# Patient Record
Sex: FEMALE | Race: WHITE | NOT HISPANIC OR LATINO | URBAN - METROPOLITAN AREA
[De-identification: names, ages, dates, MRNs, and addresses within clinical notes are randomized per-mention and may not be internally consistent; named-entity substitution may affect disease eponyms.]

---

## 2023-01-12 ENCOUNTER — OFFICE VISIT (OUTPATIENT)
Dept: URBAN - METROPOLITAN AREA CLINIC 80 | Facility: CLINIC | Age: 63
End: 2023-01-12

## 2023-01-31 ENCOUNTER — OFFICE VISIT (OUTPATIENT)
Dept: URBAN - METROPOLITAN AREA TELEHEALTH 2 | Facility: TELEHEALTH | Age: 63
End: 2023-01-31
Payer: COMMERCIAL

## 2023-01-31 ENCOUNTER — DASHBOARD ENCOUNTERS (OUTPATIENT)
Age: 63
End: 2023-01-31

## 2023-01-31 VITALS — HEIGHT: 67 IN | WEIGHT: 140 LBS | BODY MASS INDEX: 21.97 KG/M2

## 2023-01-31 DIAGNOSIS — Z12.11 COLON CANCER SCREENING: ICD-10-CM

## 2023-01-31 DIAGNOSIS — Z80.0 FAMILY HISTORY OF COLON CANCER: ICD-10-CM

## 2023-01-31 PROCEDURE — 99202 OFFICE O/P NEW SF 15 MIN: CPT | Performed by: INTERNAL MEDICINE

## 2023-01-31 RX ORDER — ROSUVASTATIN CALCIUM 10 MG/1
TABLET, FILM COATED ORAL
Qty: 90 TABLET | Status: ACTIVE | COMMUNITY

## 2023-01-31 RX ORDER — LEVOTHYROXINE SODIUM 0.05 MG/1
TABLET ORAL
Qty: 90 TABLET | Status: ACTIVE | COMMUNITY

## 2023-01-31 NOTE — HPI-TODAY'S VISIT:
GF CRC- on an every 5 year Colonoscopy schedule  Was hiking in September, noted some blood when wiping.  Lost 55 pounds with intermittent fasting.  Notes she feels so much better with this program.
24.3

## 2023-03-23 ENCOUNTER — WEB ENCOUNTER (OUTPATIENT)
Dept: URBAN - METROPOLITAN AREA SURGERY CENTER 19 | Facility: SURGERY CENTER | Age: 63
End: 2023-03-23

## 2023-03-28 ENCOUNTER — OFFICE VISIT (OUTPATIENT)
Dept: URBAN - METROPOLITAN AREA SURGERY CENTER 19 | Facility: SURGERY CENTER | Age: 63
End: 2023-03-28
Payer: COMMERCIAL

## 2023-03-28 DIAGNOSIS — Z80.0 FAMILY HISTORY OF COLON CANCER: ICD-10-CM

## 2023-03-28 DIAGNOSIS — Z12.11 COLON CANCER SCREENING: ICD-10-CM

## 2023-03-28 PROCEDURE — G8907 PT DOC NO EVENTS ON DISCHARG: HCPCS | Performed by: INTERNAL MEDICINE

## 2023-03-28 PROCEDURE — 45378 DIAGNOSTIC COLONOSCOPY: CPT | Performed by: INTERNAL MEDICINE

## 2023-03-28 RX ORDER — ROSUVASTATIN CALCIUM 10 MG/1
TABLET, FILM COATED ORAL
Qty: 90 TABLET | Status: ACTIVE | COMMUNITY

## 2023-03-28 RX ORDER — LEVOTHYROXINE SODIUM 0.05 MG/1
TABLET ORAL
Qty: 90 TABLET | Status: ACTIVE | COMMUNITY

## 2024-01-17 ENCOUNTER — OFFICE VISIT (OUTPATIENT)
Dept: ORTHOPEDIC SURGERY | Facility: CLINIC | Age: 64
End: 2024-01-17
Payer: COMMERCIAL

## 2024-01-17 VITALS — HEIGHT: 68 IN | TEMPERATURE: 98.3 F | WEIGHT: 138.8 LBS | BODY MASS INDEX: 21.04 KG/M2

## 2024-01-17 DIAGNOSIS — M25.562 PAIN IN BOTH KNEES, UNSPECIFIED CHRONICITY: Primary | ICD-10-CM

## 2024-01-17 DIAGNOSIS — M25.561 PAIN IN BOTH KNEES, UNSPECIFIED CHRONICITY: Primary | ICD-10-CM

## 2024-01-17 DIAGNOSIS — M17.10 ARTHRITIS OF KNEE: ICD-10-CM

## 2024-01-17 PROBLEM — M17.11 ARTHRITIS OF RIGHT KNEE: Status: ACTIVE | Noted: 2024-01-17

## 2024-01-17 RX ORDER — DICYCLOMINE HYDROCHLORIDE 10 MG/1
CAPSULE ORAL
COMMUNITY

## 2024-01-17 RX ORDER — PREGABALIN 75 MG/1
150 CAPSULE ORAL ONCE
OUTPATIENT
Start: 2024-03-14 | End: 2024-01-17

## 2024-01-17 RX ORDER — ACETAMINOPHEN 325 MG/1
1000 TABLET ORAL ONCE
OUTPATIENT
Start: 2024-03-14 | End: 2024-01-17

## 2024-01-17 RX ORDER — ROSUVASTATIN CALCIUM 10 MG/1
TABLET, COATED ORAL
COMMUNITY

## 2024-01-17 RX ORDER — CHLORHEXIDINE GLUCONATE 500 MG/1
CLOTH TOPICAL TAKE AS DIRECTED
OUTPATIENT
Start: 2024-01-17

## 2024-01-17 RX ORDER — CEFAZOLIN SODIUM 2 G/100ML
2 INJECTION, SOLUTION INTRAVENOUS ONCE
OUTPATIENT
Start: 2024-03-14 | End: 2024-01-17

## 2024-01-17 RX ORDER — VANCOMYCIN HYDROCHLORIDE 1 G/200ML
15 INJECTION, SOLUTION INTRAVENOUS ONCE
OUTPATIENT
Start: 2024-03-14 | End: 2024-01-17

## 2024-01-17 RX ORDER — LEVOTHYROXINE SODIUM 0.05 MG/1
TABLET ORAL
COMMUNITY

## 2024-01-17 RX ORDER — MELOXICAM 15 MG/1
15 TABLET ORAL ONCE
OUTPATIENT
Start: 2024-03-14 | End: 2024-01-17

## 2024-01-19 ENCOUNTER — PATIENT ROUNDING (BHMG ONLY) (OUTPATIENT)
Dept: ORTHOPEDIC SURGERY | Facility: CLINIC | Age: 64
End: 2024-01-19
Payer: COMMERCIAL

## 2024-01-19 PROBLEM — M17.10 ARTHRITIS OF KNEE: Status: ACTIVE | Noted: 2024-01-17

## 2024-01-19 NOTE — PROGRESS NOTES
A [place] Message has been sent to the patient for PATIENT ROUNDING with [facility]

## 2024-01-22 ENCOUNTER — TELEPHONE (OUTPATIENT)
Dept: ORTHOPEDIC SURGERY | Facility: CLINIC | Age: 64
End: 2024-01-22
Payer: COMMERCIAL

## 2024-01-22 NOTE — TELEPHONE ENCOUNTER
I spoke to patient.  I answered all questions to her satisfaction.  She verbalized understanding and appreciated the call

## 2024-01-22 NOTE — TELEPHONE ENCOUNTER
----- Message from Linette Leigh sent at 1/22/2024  8:16 AM EST -----  Regarding: FW: Welcome  Contact: [phone]  Please review pt message regarding pre-op instructions.   Pt scheduled for unicompartment knee replacement: 3/14/24      ----- Message -----  From: Yessica Mejia  Sent: 1/22/2024   8:02 AM EST  To: [facility] [place]  Subject: FW: Welcome                                      Please see message below  ----- Message -----  From: Berenice Granger  Sent: 1/19/2024   2:16 PM EST  To: Yessica Mejia  Subject: Welcome                                          I do have a question about how to follow guidelines/ Anesthesia guidelines and protocol for chlorohexidine skin prep… it is to be completed by Jan 22nd… it doesn’t tell me how to go about it? Thanks

## 2024-02-29 ENCOUNTER — PRE-ADMISSION TESTING (OUTPATIENT)
Dept: PREADMISSION TESTING | Facility: HOSPITAL | Age: 64
End: 2024-02-29
Payer: COMMERCIAL

## 2024-02-29 VITALS
HEIGHT: 66 IN | OXYGEN SATURATION: 98 % | SYSTOLIC BLOOD PRESSURE: 137 MMHG | BODY MASS INDEX: 23.19 KG/M2 | TEMPERATURE: 97.8 F | HEART RATE: 81 BPM | DIASTOLIC BLOOD PRESSURE: 78 MMHG | WEIGHT: 144.3 LBS | RESPIRATION RATE: 16 BRPM

## 2024-02-29 LAB
ANION GAP SERPL CALCULATED.3IONS-SCNC: 11 MMOL/L (ref 5–15)
BUN SERPL-MCNC: 16 MG/DL (ref 8–23)
BUN/CREAT SERPL: 20.8 (ref 7–25)
CALCIUM SPEC-SCNC: 9.5 MG/DL (ref 8.6–10.5)
CHLORIDE SERPL-SCNC: 104 MMOL/L (ref 98–107)
CO2 SERPL-SCNC: 25 MMOL/L (ref 22–29)
CREAT SERPL-MCNC: 0.77 MG/DL (ref 0.57–1)
DEPRECATED RDW RBC AUTO: 40.2 FL (ref 37–54)
EGFRCR SERPLBLD CKD-EPI 2021: 87.9 ML/MIN/1.73
ERYTHROCYTE [DISTWIDTH] IN BLOOD BY AUTOMATED COUNT: 12.5 % (ref 12.3–15.4)
GLUCOSE SERPL-MCNC: 98 MG/DL (ref 65–99)
HCT VFR BLD AUTO: 37.6 % (ref 34–46.6)
HGB BLD-MCNC: 12.2 G/DL (ref 12–15.9)
MCH RBC QN AUTO: 28.9 PG (ref 26.6–33)
MCHC RBC AUTO-ENTMCNC: 32.4 G/DL (ref 31.5–35.7)
MCV RBC AUTO: 89.1 FL (ref 79–97)
PLATELET # BLD AUTO: 247 10*3/MM3 (ref 140–450)
PMV BLD AUTO: 9.3 FL (ref 6–12)
POTASSIUM SERPL-SCNC: 4 MMOL/L (ref 3.5–5.2)
QT INTERVAL: 401 MS
QTC INTERVAL: 408 MS
RBC # BLD AUTO: 4.22 10*6/MM3 (ref 3.77–5.28)
SODIUM SERPL-SCNC: 140 MMOL/L (ref 136–145)
WBC NRBC COR # BLD AUTO: 4.28 10*3/MM3 (ref 3.4–10.8)

## 2024-02-29 PROCEDURE — 93010 ELECTROCARDIOGRAM REPORT: CPT | Performed by: INTERNAL MEDICINE

## 2024-02-29 PROCEDURE — 93005 ELECTROCARDIOGRAM TRACING: CPT

## 2024-02-29 PROCEDURE — 80048 BASIC METABOLIC PNL TOTAL CA: CPT

## 2024-02-29 PROCEDURE — 85027 COMPLETE CBC AUTOMATED: CPT

## 2024-02-29 PROCEDURE — 36415 COLL VENOUS BLD VENIPUNCTURE: CPT

## 2024-02-29 RX ORDER — ACETAMINOPHEN 500 MG
500-1000 TABLET ORAL EVERY 6 HOURS PRN
COMMUNITY

## 2024-02-29 RX ORDER — GABAPENTIN 300 MG/1
300 CAPSULE ORAL EVERY EVENING
COMMUNITY

## 2024-02-29 RX ORDER — FEXOFENADINE HCL 180 MG/1
180 TABLET ORAL DAILY PRN
COMMUNITY

## 2024-02-29 NOTE — DISCHARGE INSTRUCTIONS
Take the following medications the morning of surgery: LEVOTHYROXINE, OMEPRAZOLE      If you are on prescription narcotic pain medication to control your pain you may also take that medication the morning of surgery.    General Instructions:  Do not eat solid food after midnight the night before surgery.  You may drink clear liquids day of surgery but must stop at least one hour before your hospital arrival time.  It is beneficial for you to have a clear drink that contains carbohydrates the day of surgery.  We suggest a 12 to 20 ounce bottle of Gatorade or Powerade for non-diabetic patients or a 12 to 20 ounce bottle of G2 or Powerade Zero for diabetic patients.     Clear liquids are liquids you can see through.  Nothing red in color.     Plain water                               Sports drinks  Sodas                                   Gelatin (Jell-O)  Fruit juices without pulp such as white grape juice and apple juice  Popsicles that contain no fruit or yogurt  Tea or coffee (no cream or milk added)  Gatorade / Powerade  G2 / Powerade Zero    Patients who avoid smoking, chewing tobacco and alcohol for 4 weeks prior to surgery have a reduced risk of post-operative complications.  Quit smoking as many days before surgery as you can.  Do not smoke, use chewing tobacco or drink alcohol the day of surgery.   If applicable bring your C-PAP/ BI-PAP machine in with you to preop day of surgery.  Bring any papers given to you in the doctor’s office.  Wear clean comfortable clothes.  Do not wear contact lenses, false eyelashes or make-up.  Bring a case for your glasses.   Remove all piercings.  Leave jewelry and any other valuables at home.  The Pre-Admission Testing nurse will instruct you to bring medications if unable to obtain an accurate list in Pre-Admission Testing.            Preventing a Surgical Site Infection:  For 2 to 3 days before surgery, avoid shaving with a razor because the razor can irritate skin and make  it easier to develop an infection.    Any areas of open skin can increase the risk of a post-operative wound infection by allowing bacteria to enter and travel throughout the body.  Notify your surgeon if you have any skin wounds / rashes even if it is not near the expected surgical site.  The area will need assessed to determine if surgery should be delayed until it is healed.  The night prior to surgery shower using a fresh bar of anti-bacterial soap (such as Dial) and clean washcloth.  Sleep in a clean bed with clean clothing.  Do not allow pets to sleep with you.  Shower on the morning of surgery using a fresh bar of anti-bacterial soap (such as Dial) and clean washcloth.  Dry with a clean towel and dress in clean clothing.  Ask your surgeon if you will be receiving antibiotics prior to surgery.  Make sure you, your family, and all healthcare providers clean their hands with soap and water or an alcohol based hand  before caring for you or your wound.    Day of surgery:  Your arrival time is approximately two hours before your scheduled surgery time.  Upon arrival, a Pre-op nurse and Anesthesiologist will review your health history, obtain vital signs, and answer questions you may have.  The only belongings needed at this time will be a list of your home medications and if applicable your C-PAP/BI-PAP machine.  A Pre-op nurse will start an IV and you may receive medication in preparation for surgery, including something to help you relax.     Please be aware that surgery does come with discomfort.  We want to make every effort to control your discomfort so please discuss any uncontrolled symptoms with your nurse.   Your doctor will most likely have prescribed pain medications.      If you are going home after surgery you will receive individualized written care instructions before being discharged.  A responsible adult must drive you to and from the hospital on the day of your surgery and ideally stay  with you through the night.   .  Discharge prescriptions can be filled by the hospital pharmacy during regular pharmacy hours.  If you are having surgery late in the day/evening your prescription may be e-prescribed to your pharmacy.  Please verify your pharmacy hours or chose a 24 hour pharmacy to avoid not having access to your prescription because your pharmacy has closed for the day.    If you are staying overnight following surgery, you will be transported to your hospital room following the recovery period.  [facility] has all private rooms.    If you have any questions please call Pre-Admission Testing at [phone].  Deductibles and co-payments are collected on the day of service. Please be prepared to pay the required co-pay, deductible or deposit on the day of service as defined by your plan.      CHLORHEXIDINE CLOTH INSTRUCTIONS  The morning of surgery follow these instructions using the Chlorhexidine cloths you've been given.  These steps reduce bacteria on the body.  Do not use the cloths near your eyes, ears mouth, genitalia or on open wounds.  Throw the cloths away after use but do not try to flush them down a toilet.      Open and remove one cloth at a time from the package.    Leave the cloth unfolded and begin the bathing.  Massage the skin with the cloths using gentle pressure to remove bacteria.  Do not scrub harshly.   Follow the steps below with one 2% CHG cloth per area (6 total cloths).  One cloth for neck, shoulders and chest.  One cloth for both arms, hands, fingers and underarms (do underarms last).  One cloth for the abdomen followed by groin.  One cloth for right leg and foot including between the toes.  One cloth for left leg and foot including between the toes.  The last cloth is to be used for the back of the neck, back and buttocks.    Allow the CHG to air dry 3 minutes on the skin which will give it time to work and decrease the chance of irritation.  The skin may  feel sticky until it is dry.  Do not rinse with water or any other liquid or you will lose the beneficial effects of the CHG.  If mild skin irritation occurs, do rinse the skin to remove the CHG.  Report this to the nurse at time of admission.  Do not apply lotions, creams, ointments, deodorants or perfumes after using the clothes. Dress in clean clothes before coming to the hospital.    Call your surgeon immediately if you experience any of the following symptoms:  Sore Throat  Shortness of Breath or difficulty breathing  Cough  Chills  Body soreness or muscle pain  Headache  Fever  New loss of taste or smell  Do not arrive for your surgery ill.  Your procedure will need to be rescheduled to another time.  You will need to call your physician before the day of surgery to avoid any unnecessary exposure to hospital staff as well as other patients.

## 2024-03-05 ENCOUNTER — TELEPHONE (OUTPATIENT)
Dept: ORTHOPEDIC SURGERY | Facility: HOSPITAL | Age: 64
End: 2024-03-05
Payer: COMMERCIAL

## 2024-03-05 NOTE — TELEPHONE ENCOUNTER
Risk Factor yes no   Age >75  X   BMI <20 >40  X   Patient History     Chronic Pain (2 or more meds/Pain Management)  X   ETOH (more than 3 drinks Daily)  X   Uncontrolled Depression/Bipolar/Schizoaffective Disorder  X   Arrhythmias--  X   Stent placement/MI  X   DVT/PE  X   Pacemaker  X   HTN (uncontrolled or requiring more than 2 medications)  X   CHF/Retained fluids/Edema  X   Stroke with Residual   X   COPD/Asthma  X   ABDIFATAH--Non-compliant with CPAP  X   Diabetes (on insulin or more than 2 meds)         A1C:  X   BPH/Urinary retention (on medication)  X   CKD  X   Home environment and support     Current ambulation status (use of cane, walker, W/C, Multiple falls/weakness)  X   Stairs to enter and throughout home X    Lives Alone  X   Doesn't have support at home  X   Outpatient Screening Assessment    Home needs: (Walker/BSC):  Has walker  ? Steps 6 steps with rail  Caregiver 24-48hrs post-discharge:      Discharge Plan:   [facility]--Doing [facility]     Prescriptions: Meds to bed    Home medications:   [] Blood thinner/anti-coag therapy--   [] BPH or diuretic--  [] BP meds--   ? Pain/Anti-inflammatories--Gabapentin  Pre-op Education:  Educate patient on spinal anesthesia/pain control:  ? patient verbalize understanding    Educate patient on hospital course/timeline:  ?  patient verbalize understanding    Joint Care Class:  ?  yes [] no  Notes:    FEMALE-ADULT-[place]

## 2024-03-08 ENCOUNTER — OFFICE VISIT (OUTPATIENT)
Dept: ORTHOPEDIC SURGERY | Facility: CLINIC | Age: 64
End: 2024-03-08
Payer: COMMERCIAL

## 2024-03-08 VITALS — WEIGHT: 147.6 LBS | BODY MASS INDEX: 23.72 KG/M2 | TEMPERATURE: 97.3 F | HEIGHT: 66 IN

## 2024-03-08 DIAGNOSIS — Z01.818 PREOP EXAMINATION: Primary | ICD-10-CM

## 2024-03-08 RX ORDER — TIZANIDINE HYDROCHLORIDE 4 MG/1
4 CAPSULE, GELATIN COATED ORAL DAILY
COMMUNITY

## 2024-03-08 NOTE — PROGRESS NOTES
History & Physical       Patient: Berenice Granger    YOB: 1960    Medical Record Number: [id number]    Chief Complaints: Right knee osteoarthritis    History of Present Illness: 63 y.o. female presents today in anticipation of upcoming unicompartment knee replacement surgery.  Patient has a long history of worsening symptoms.  Describes the pain as severe, constant, and typically dull and achy. She has tried and failed prolonged conservative treatment. She is struggling with routine daily activities.  This has become a significant issue for overall quality of life. She cannot walk any prolonged distances without having to rest.     Allergies:   Allergies   Allergen Reactions    Latex Itching    Penicillins Hives       Medications:   Home Medications:    Current Outpatient Medications:     acetaminophen (TYLENOL) 500 MG tablet, Take 1-2 tablets by mouth Every 6 (Six) Hours As Needed for Mild Pain., Disp: , Rfl:     dicyclomine (BENTYL) 10 MG capsule, Take 1 capsule by mouth 2 (Two) Times a Day As Needed for Abdominal Cramping., Disp: , Rfl:     fexofenadine (ALLEGRA) 180 MG tablet, Take 1 tablet by mouth Daily As Needed (SINUS ALLERGY ISSUES)., Disp: , Rfl:     gabapentin (NEURONTIN) 300 MG capsule, Take 1 capsule by mouth Every Evening., Disp: , Rfl:     levothyroxine (SYNTHROID, LEVOTHROID) 50 MCG tablet, Take 1 tablet by mouth Daily., Disp: , Rfl:     Omeprazole 20 MG Tablet Delayed Release Dispersible, Take 1 tablet by mouth Daily., Disp: , Rfl:     rosuvastatin (CRESTOR) 10 MG tablet, Take 1 tablet by mouth Every Night., Disp: , Rfl:     TiZANidine (ZANAFLEX) 4 MG capsule, Take 1 capsule by mouth Daily., Disp: , Rfl:     Past Medical History:   Diagnosis Date    Arthritis of back 2015?    CTS (carpal tunnel syndrome) 2023    Corisone Injection in both wrists    Disease of thyroid gland     Dry senile macular degeneration     GERD (gastroesophageal reflux disease)     Hyperlipidemia      "Hypothyroidism     Knee swelling     TAE KNEES. HISTORY OF TORN MENISCUS IN TAE KNEES    Lower back pain     Osteoarthritis (arthritis due to wear and tear of joints)     Seasonal allergies     Spastic colon     Tear of meniscus of knee 2020 2021    Both knees          Past Surgical History:   Procedure Laterality Date    COLONOSCOPY      KNEE ARTHROSCOPY      TAE KNEES    LAPAROSCOPIC CHOLECYSTECTOMY      UTERINE FIBROID EMBOLIZATION      WISDOM TOOTH EXTRACTION            Social History     Occupational History    Not on file   Tobacco Use    Smoking status: Never    Smokeless tobacco: Not on file   Vaping Use    Vaping status: Never Used   Substance and Sexual Activity    Alcohol use: Yes     Alcohol/week: 3.0 standard drinks of alcohol     Types: 3 Glasses of wine per week     Comment: SOME WINE WITH DINNER 3-4 TIMES WEEK    Drug use: Never    Sexual activity: Yes     Partners: Male     Birth control/protection: Post-menopausal      Social History     Social History Narrative    Not on file          Family History   Problem Relation Age of Onset    Osteoporosis Father     Malig Hyperthermia Neg Hx        Review of Systems:  A 14-point review of systems is reviewed with the patient.  Pertinent positives are listed above.  All others are negative.    Physical Exam: 63 y.o. female    Vitals:    03/08/24 1342   Temp: 97.3 °F (36.3 °C)   Weight: 67 kg (147 lb 9.6 oz)   Height: 167.6 cm (66\")       General:  Patient is awake and alert.  Appears in no acute distress or discomfort.    Psych:  Affect and demeanor are appropriate.    Eyes:  Conjunctivae and sclerae; appear grossly normal.  Eyes track well and EOM seems to be intact.    Ears:  No gross abnormalities.  Hearing adequate for the exam.    Cardiovascular:  Regular rate and rhythm.    Lungs:  Good chest expansion.  Breathing unlabored.    Lymph:  No palpable adenopathy about neck or axillae.    Right lower extremity:  Skin benign and intact without evidence for " "swelling, masses or atrophy.  No palpable masses.  Focal tenderness noted over medial joint line.  ROM is: 0-120°.   Knee is stable on exam.  Good strength throughout the lower leg and foot.  Intact sensation throughout.  Palpable pedal pulses with brisk cap refill.    Diagnostic Tests:  Lab Results   Component Value Date    GLUCOSE 98 02/29/2024    CALCIUM 9.5 02/29/2024     02/29/2024    K 4.0 02/29/2024    CO2 25.0 02/29/2024     02/29/2024    BUN 16 02/29/2024    CREATININE 0.77 02/29/2024    BCR 20.8 02/29/2024    ANIONGAP 11.0 02/29/2024     Lab Results   Component Value Date    WBC 4.28 02/29/2024    HGB 12.2 02/29/2024    HCT 37.6 02/29/2024    MCV 89.1 02/29/2024     02/29/2024     No results found for: \"INR\", \"PROTIME\"    Imaging:  Previous x-rays of the knee are reviewed.  The studies show mild medial and patellofemoral compartment osteoarthritis with joint space narrowing, osteophyte formation, and subchondral sclerosis. The leg length alignment x-ray shows fairly neutral alignment.     Assessment:  Right knee osteoarthritis    Plan: We will plan on proceeding with a right unicompartment knee arthroplasty at the patient's request.  I reviewed details of procedure with patient today and discussed all the risks, benefits, alternatives, and limitations of the procedure in laymen's terms with the risks including but not limited to:  neurovascular damage resulting in permanent dysfunction or footdrop and potential need for further surgery, bleeding, infection, hematoma, chronic pain, worsening of pain, persistent symptoms potentially necessitating revision, prosthesis-related problems including loosening or allergy, swelling, loss of motion and arthrofibrosis, weakness, stiffness, instability, DVT, pulmonary embolus, death, stroke, complex regional pain syndrome, and need for additional procedures.  Patient verbalized understanding, and was given the opportunity to ask and have all " questions answered today.  Patient understands that intraoperative findings may necessitate a total knee arthroplasty and agrees to proceed.  No guarantees were given regarding results of surgery.      Zuly Aguilera, AGNIESZKA  03/08/24

## 2024-03-08 NOTE — H&P (VIEW-ONLY)
History & Physical       Patient: Berenice Granger    YOB: 1960    Medical Record Number: [id number]    Chief Complaints: Right knee osteoarthritis    History of Present Illness: 63 y.o. female presents today in anticipation of upcoming unicompartment knee replacement surgery.  Patient has a long history of worsening symptoms.  Describes the pain as severe, constant, and typically dull and achy. She has tried and failed prolonged conservative treatment. She is struggling with routine daily activities.  This has become a significant issue for overall quality of life. She cannot walk any prolonged distances without having to rest.     Allergies:   Allergies   Allergen Reactions    Latex Itching    Penicillins Hives       Medications:   Home Medications:    Current Outpatient Medications:     acetaminophen (TYLENOL) 500 MG tablet, Take 1-2 tablets by mouth Every 6 (Six) Hours As Needed for Mild Pain., Disp: , Rfl:     dicyclomine (BENTYL) 10 MG capsule, Take 1 capsule by mouth 2 (Two) Times a Day As Needed for Abdominal Cramping., Disp: , Rfl:     fexofenadine (ALLEGRA) 180 MG tablet, Take 1 tablet by mouth Daily As Needed (SINUS ALLERGY ISSUES)., Disp: , Rfl:     gabapentin (NEURONTIN) 300 MG capsule, Take 1 capsule by mouth Every Evening., Disp: , Rfl:     levothyroxine (SYNTHROID, LEVOTHROID) 50 MCG tablet, Take 1 tablet by mouth Daily., Disp: , Rfl:     Omeprazole 20 MG Tablet Delayed Release Dispersible, Take 1 tablet by mouth Daily., Disp: , Rfl:     rosuvastatin (CRESTOR) 10 MG tablet, Take 1 tablet by mouth Every Night., Disp: , Rfl:     TiZANidine (ZANAFLEX) 4 MG capsule, Take 1 capsule by mouth Daily., Disp: , Rfl:     Past Medical History:   Diagnosis Date    Arthritis of back 2015?    CTS (carpal tunnel syndrome) 2023    Corisone Injection in both wrists    Disease of thyroid gland     Dry senile macular degeneration     GERD (gastroesophageal reflux disease)     Hyperlipidemia      "Hypothyroidism     Knee swelling     TAE KNEES. HISTORY OF TORN MENISCUS IN TAE KNEES    Lower back pain     Osteoarthritis (arthritis due to wear and tear of joints)     Seasonal allergies     Spastic colon     Tear of meniscus of knee 2020 2021    Both knees          Past Surgical History:   Procedure Laterality Date    COLONOSCOPY      KNEE ARTHROSCOPY      TAE KNEES    LAPAROSCOPIC CHOLECYSTECTOMY      UTERINE FIBROID EMBOLIZATION      WISDOM TOOTH EXTRACTION            Social History     Occupational History    Not on file   Tobacco Use    Smoking status: Never    Smokeless tobacco: Not on file   Vaping Use    Vaping status: Never Used   Substance and Sexual Activity    Alcohol use: Yes     Alcohol/week: 3.0 standard drinks of alcohol     Types: 3 Glasses of wine per week     Comment: SOME WINE WITH DINNER 3-4 TIMES WEEK    Drug use: Never    Sexual activity: Yes     Partners: Male     Birth control/protection: Post-menopausal      Social History     Social History Narrative    Not on file          Family History   Problem Relation Age of Onset    Osteoporosis Father     Malig Hyperthermia Neg Hx        Review of Systems:  A 14-point review of systems is reviewed with the patient.  Pertinent positives are listed above.  All others are negative.    Physical Exam: 63 y.o. female    Vitals:    03/08/24 1342   Temp: 97.3 °F (36.3 °C)   Weight: 67 kg (147 lb 9.6 oz)   Height: 167.6 cm (66\")       General:  Patient is awake and alert.  Appears in no acute distress or discomfort.    Psych:  Affect and demeanor are appropriate.    Eyes:  Conjunctivae and sclerae; appear grossly normal.  Eyes track well and EOM seems to be intact.    Ears:  No gross abnormalities.  Hearing adequate for the exam.    Cardiovascular:  Regular rate and rhythm.    Lungs:  Good chest expansion.  Breathing unlabored.    Lymph:  No palpable adenopathy about neck or axillae.    Right lower extremity:  Skin benign and intact without evidence for " "swelling, masses or atrophy.  No palpable masses.  Focal tenderness noted over medial joint line.  ROM is: 0-120°.   Knee is stable on exam.  Good strength throughout the lower leg and foot.  Intact sensation throughout.  Palpable pedal pulses with brisk cap refill.    Diagnostic Tests:  Lab Results   Component Value Date    GLUCOSE 98 02/29/2024    CALCIUM 9.5 02/29/2024     02/29/2024    K 4.0 02/29/2024    CO2 25.0 02/29/2024     02/29/2024    BUN 16 02/29/2024    CREATININE 0.77 02/29/2024    BCR 20.8 02/29/2024    ANIONGAP 11.0 02/29/2024     Lab Results   Component Value Date    WBC 4.28 02/29/2024    HGB 12.2 02/29/2024    HCT 37.6 02/29/2024    MCV 89.1 02/29/2024     02/29/2024     No results found for: \"INR\", \"PROTIME\"    Imaging:  Previous x-rays of the knee are reviewed.  The studies show mild medial and patellofemoral compartment osteoarthritis with joint space narrowing, osteophyte formation, and subchondral sclerosis. The leg length alignment x-ray shows fairly neutral alignment.     Assessment:  Right knee osteoarthritis    Plan: We will plan on proceeding with a right unicompartment knee arthroplasty at the patient's request.  I reviewed details of procedure with patient today and discussed all the risks, benefits, alternatives, and limitations of the procedure in laymen's terms with the risks including but not limited to:  neurovascular damage resulting in permanent dysfunction or footdrop and potential need for further surgery, bleeding, infection, hematoma, chronic pain, worsening of pain, persistent symptoms potentially necessitating revision, prosthesis-related problems including loosening or allergy, swelling, loss of motion and arthrofibrosis, weakness, stiffness, instability, DVT, pulmonary embolus, death, stroke, complex regional pain syndrome, and need for additional procedures.  Patient verbalized understanding, and was given the opportunity to ask and have all " questions answered today.  Patient understands that intraoperative findings may necessitate a total knee arthroplasty and agrees to proceed.  No guarantees were given regarding results of surgery.      AGNIESZKA Reyes  03/08/24 03/13/24     Addendum: I had a peer to peer review on this patient this morning.  They wanted to confirm that it has been more than 3 months since her last cortisone injection.  I confirmed that that is the case.  They wanted to make sure that she had recent x-rays with significant arthritis.  I measured her joint space on the alignment films at 1.8 mm which makes her grade 3.    Celestine Beltran MD

## 2024-03-13 ENCOUNTER — TELEPHONE (OUTPATIENT)
Dept: ORTHOPEDIC SURGERY | Facility: CLINIC | Age: 64
End: 2024-03-13
Payer: COMMERCIAL

## 2024-03-13 NOTE — TELEPHONE ENCOUNTER
----- Message from Linette Leigh sent at 3/13/2024  8:00 AM EDT -----  Regarding: Surgery   Contact: [phone]  Please review pt message      ----- Message -----  From: Berenice Granger  Sent: 3/12/2024   5:27 PM EDT  To: [facility]  Subject: Surgery                                          Not sure if there is something I can do with my insurance company to get the surgery on Thursday… please let me know what I need to do? I will not be able to go through with the surgery without my insurance…

## 2024-03-19 ENCOUNTER — APPOINTMENT (OUTPATIENT)
Dept: GENERAL RADIOLOGY | Facility: HOSPITAL | Age: 64
End: 2024-03-19
Payer: COMMERCIAL

## 2024-03-19 ENCOUNTER — TELEPHONE (OUTPATIENT)
Dept: ORTHOPEDIC SURGERY | Facility: HOSPITAL | Age: 64
End: 2024-03-19
Payer: COMMERCIAL

## 2024-03-19 ENCOUNTER — ANESTHESIA (OUTPATIENT)
Dept: PERIOP | Facility: HOSPITAL | Age: 64
End: 2024-03-19
Payer: COMMERCIAL

## 2024-03-19 ENCOUNTER — ANESTHESIA EVENT (OUTPATIENT)
Dept: PERIOP | Facility: HOSPITAL | Age: 64
End: 2024-03-19
Payer: COMMERCIAL

## 2024-03-19 ENCOUNTER — HOME HEALTH ADMISSION (OUTPATIENT)
Dept: HOME HEALTH SERVICES | Facility: HOME HEALTHCARE | Age: 64
End: 2024-03-19
Payer: COMMERCIAL

## 2024-03-19 ENCOUNTER — HOSPITAL ENCOUNTER (OUTPATIENT)
Facility: HOSPITAL | Age: 64
Discharge: HOME | End: 2024-03-19
Attending: ORTHOPAEDIC SURGERY | Admitting: ORTHOPAEDIC SURGERY
Payer: COMMERCIAL

## 2024-03-19 ENCOUNTER — TELEPHONE (OUTPATIENT)
Dept: PHYSICAL THERAPY | Facility: CLINIC | Age: 64
End: 2024-03-19
Payer: COMMERCIAL

## 2024-03-19 VITALS
SYSTOLIC BLOOD PRESSURE: 135 MMHG | DIASTOLIC BLOOD PRESSURE: 64 MMHG | RESPIRATION RATE: 16 BRPM | HEART RATE: 89 BPM | TEMPERATURE: 99.5 F | OXYGEN SATURATION: 100 %

## 2024-03-19 DIAGNOSIS — Z96.651 STATUS POST RIGHT KNEE REPLACEMENT: Primary | ICD-10-CM

## 2024-03-19 DIAGNOSIS — M17.10 ARTHRITIS OF KNEE: ICD-10-CM

## 2024-03-19 PROCEDURE — 25010000002 ROPIVACAINE PER 1 MG: Performed by: ORTHOPAEDIC SURGERY

## 2024-03-19 PROCEDURE — 25810000003 LACTATED RINGERS PER 1000 ML: Performed by: ANESTHESIOLOGY

## 2024-03-19 PROCEDURE — 25010000002 EPINEPHRINE 1 MG/ML SOLUTION 30 ML VIAL: Performed by: ORTHOPAEDIC SURGERY

## 2024-03-19 PROCEDURE — 73560 X-RAY EXAM OF KNEE 1 OR 2: CPT

## 2024-03-19 PROCEDURE — 25810000003 SODIUM CHLORIDE 0.9 % SOLUTION 250 ML FLEX CONT: Performed by: ORTHOPAEDIC SURGERY

## 2024-03-19 PROCEDURE — C1776 JOINT DEVICE (IMPLANTABLE): HCPCS | Performed by: ORTHOPAEDIC SURGERY

## 2024-03-19 PROCEDURE — 25010000002 ONDANSETRON PER 1 MG: Performed by: ANESTHESIOLOGY

## 2024-03-19 PROCEDURE — C1713 ANCHOR/SCREW BN/BN,TIS/BN: HCPCS | Performed by: ORTHOPAEDIC SURGERY

## 2024-03-19 PROCEDURE — 25010000002 KETOROLAC TROMETHAMINE PER 15 MG: Performed by: ORTHOPAEDIC SURGERY

## 2024-03-19 PROCEDURE — 25010000002 VANCOMYCIN 1 G RECONSTITUTED SOLUTION 1 EACH VIAL: Performed by: ORTHOPAEDIC SURGERY

## 2024-03-19 PROCEDURE — 27446 REVISION OF KNEE JOINT: CPT | Performed by: ORTHOPAEDIC SURGERY

## 2024-03-19 PROCEDURE — 25010000002 ROPIVACAINE PER 1 MG: Performed by: ANESTHESIOLOGY

## 2024-03-19 PROCEDURE — 25810000003 LACTATED RINGERS SOLUTION: Performed by: ORTHOPAEDIC SURGERY

## 2024-03-19 PROCEDURE — 25010000002 DEXAMETHASONE SODIUM PHOSPHATE 20 MG/5ML SOLUTION: Performed by: ANESTHESIOLOGY

## 2024-03-19 PROCEDURE — 25010000002 HYDROMORPHONE PER 4 MG: Performed by: ANESTHESIOLOGY

## 2024-03-19 PROCEDURE — 25010000002 SUGAMMADEX 200 MG/2ML SOLUTION: Performed by: ANESTHESIOLOGY

## 2024-03-19 PROCEDURE — 25010000002 FENTANYL CITRATE (PF) 50 MCG/ML SOLUTION: Performed by: ANESTHESIOLOGY

## 2024-03-19 PROCEDURE — 25010000002 MIDAZOLAM PER 1 MG: Performed by: ANESTHESIOLOGY

## 2024-03-19 PROCEDURE — 97162 PT EVAL MOD COMPLEX 30 MIN: CPT | Performed by: PHYSICAL THERAPIST

## 2024-03-19 PROCEDURE — 25010000002 CEFAZOLIN PER 500 MG: Performed by: ORTHOPAEDIC SURGERY

## 2024-03-19 PROCEDURE — 27446 REVISION OF KNEE JOINT: CPT | Performed by: TECHNICIAN, OTHER

## 2024-03-19 PROCEDURE — 25010000002 MORPHINE PER 10 MG: Performed by: ORTHOPAEDIC SURGERY

## 2024-03-19 PROCEDURE — 25010000002 DEXAMETHASONE PER 1 MG: Performed by: ANESTHESIOLOGY

## 2024-03-19 PROCEDURE — 25010000002 PROPOFOL 10 MG/ML EMULSION: Performed by: ANESTHESIOLOGY

## 2024-03-19 DEVICE — JOURNEY II UNI XLPE TIBIA INSERT                                    MEDIAL SZ 5-6 8MM
Type: IMPLANTABLE DEVICE | Site: KNEE | Status: FUNCTIONAL
Brand: JOURNEY II UNI

## 2024-03-19 DEVICE — JOURNEY II UNI OXINIUM FEMORAL                                    COMPONENT 4 RM/LL
Type: IMPLANTABLE DEVICE | Site: KNEE | Status: FUNCTIONAL
Brand: JOURNEY II UNI

## 2024-03-19 DEVICE — DEV CONTRL TISS STRATAFIX SPIRAL MNCRYL UD 3/0 PLS 30CM: Type: IMPLANTABLE DEVICE | Site: KNEE | Status: FUNCTIONAL

## 2024-03-19 DEVICE — JOURNEY II UNI MEDIAL TIBIA                                    BASEPLATE SZ 6 RIGHT
Type: IMPLANTABLE DEVICE | Site: KNEE | Status: FUNCTIONAL
Brand: JOURNEY II UNI

## 2024-03-19 DEVICE — CMT BONE PALACOS R HI/VISC 1X40: Type: IMPLANTABLE DEVICE | Site: KNEE | Status: FUNCTIONAL

## 2024-03-19 DEVICE — DEV CONTRL TISS STRATAFIX SYMM PDS PLUS VIL CT-1 60CM: Type: IMPLANTABLE DEVICE | Site: KNEE | Status: FUNCTIONAL

## 2024-03-19 DEVICE — IMPLANTABLE DEVICE: Type: IMPLANTABLE DEVICE | Status: FUNCTIONAL

## 2024-03-19 RX ORDER — ACETAMINOPHEN 325 MG/1
1000 TABLET ORAL ONCE
Status: COMPLETED | OUTPATIENT
Start: 2024-03-19 | End: 2024-03-19

## 2024-03-19 RX ORDER — ASPIRIN 81 MG/1
81 TABLET ORAL 2 TIMES DAILY
Status: CANCELLED | OUTPATIENT
Start: 2024-03-20

## 2024-03-19 RX ORDER — HYDROCODONE BITARTRATE AND ACETAMINOPHEN 7.5; 325 MG/1; MG/1
1 TABLET ORAL EVERY 4 HOURS PRN
Status: DISCONTINUED | OUTPATIENT
Start: 2024-03-19 | End: 2024-03-19 | Stop reason: HOSPADM

## 2024-03-19 RX ORDER — ACETAMINOPHEN 325 MG/1
650 TABLET ORAL 2 TIMES DAILY PRN
Qty: 60 TABLET | Refills: 0 | Status: SHIPPED | OUTPATIENT
Start: 2024-03-19

## 2024-03-19 RX ORDER — SODIUM CHLORIDE, SODIUM LACTATE, POTASSIUM CHLORIDE, CALCIUM CHLORIDE 600; 310; 30; 20 MG/100ML; MG/100ML; MG/100ML; MG/100ML
INJECTION, SOLUTION INTRAVENOUS CONTINUOUS PRN
Status: DISCONTINUED | OUTPATIENT
Start: 2024-03-19 | End: 2024-03-19 | Stop reason: SURG

## 2024-03-19 RX ORDER — EPHEDRINE SULFATE 50 MG/ML
5 INJECTION, SOLUTION INTRAVENOUS ONCE AS NEEDED
Status: DISCONTINUED | OUTPATIENT
Start: 2024-03-19 | End: 2024-03-19 | Stop reason: HOSPADM

## 2024-03-19 RX ORDER — FAMOTIDINE 10 MG/ML
20 INJECTION, SOLUTION INTRAVENOUS ONCE
Status: COMPLETED | OUTPATIENT
Start: 2024-03-19 | End: 2024-03-19

## 2024-03-19 RX ORDER — DROPERIDOL 2.5 MG/ML
0.62 INJECTION, SOLUTION INTRAMUSCULAR; INTRAVENOUS
Status: DISCONTINUED | OUTPATIENT
Start: 2024-03-19 | End: 2024-03-19 | Stop reason: HOSPADM

## 2024-03-19 RX ORDER — LABETALOL HYDROCHLORIDE 5 MG/ML
5 INJECTION, SOLUTION INTRAVENOUS
Status: DISCONTINUED | OUTPATIENT
Start: 2024-03-19 | End: 2024-03-19 | Stop reason: HOSPADM

## 2024-03-19 RX ORDER — ASPIRIN 81 MG/1
81 TABLET ORAL 2 TIMES DAILY
Qty: 60 TABLET | Refills: 0 | Status: SHIPPED | OUTPATIENT
Start: 2024-03-19

## 2024-03-19 RX ORDER — KETOROLAC TROMETHAMINE 30 MG/ML
15 INJECTION, SOLUTION INTRAMUSCULAR; INTRAVENOUS ONCE AS NEEDED
Status: DISCONTINUED | OUTPATIENT
Start: 2024-03-19 | End: 2024-03-19 | Stop reason: HOSPADM

## 2024-03-19 RX ORDER — ROCURONIUM BROMIDE 10 MG/ML
INJECTION, SOLUTION INTRAVENOUS AS NEEDED
Status: DISCONTINUED | OUTPATIENT
Start: 2024-03-19 | End: 2024-03-19 | Stop reason: SURG

## 2024-03-19 RX ORDER — LIDOCAINE HYDROCHLORIDE 10 MG/ML
0.5 INJECTION, SOLUTION INFILTRATION; PERINEURAL ONCE AS NEEDED
Status: DISCONTINUED | OUTPATIENT
Start: 2024-03-19 | End: 2024-03-19 | Stop reason: HOSPADM

## 2024-03-19 RX ORDER — SODIUM CHLORIDE, SODIUM LACTATE, POTASSIUM CHLORIDE, CALCIUM CHLORIDE 600; 310; 30; 20 MG/100ML; MG/100ML; MG/100ML; MG/100ML
9 INJECTION, SOLUTION INTRAVENOUS CONTINUOUS
Status: DISCONTINUED | OUTPATIENT
Start: 2024-03-19 | End: 2024-03-19 | Stop reason: HOSPADM

## 2024-03-19 RX ORDER — HYDRALAZINE HYDROCHLORIDE 20 MG/ML
5 INJECTION INTRAMUSCULAR; INTRAVENOUS
Status: DISCONTINUED | OUTPATIENT
Start: 2024-03-19 | End: 2024-03-19 | Stop reason: HOSPADM

## 2024-03-19 RX ORDER — MIDAZOLAM HYDROCHLORIDE 1 MG/ML
1 INJECTION INTRAMUSCULAR; INTRAVENOUS
Status: DISCONTINUED | OUTPATIENT
Start: 2024-03-19 | End: 2024-03-19 | Stop reason: HOSPADM

## 2024-03-19 RX ORDER — IPRATROPIUM BROMIDE AND ALBUTEROL SULFATE 2.5; .5 MG/3ML; MG/3ML
3 SOLUTION RESPIRATORY (INHALATION) ONCE AS NEEDED
Status: DISCONTINUED | OUTPATIENT
Start: 2024-03-19 | End: 2024-03-19 | Stop reason: HOSPADM

## 2024-03-19 RX ORDER — DEXAMETHASONE SODIUM PHOSPHATE 4 MG/ML
INJECTION, SOLUTION INTRA-ARTICULAR; INTRALESIONAL; INTRAMUSCULAR; INTRAVENOUS; SOFT TISSUE AS NEEDED
Status: DISCONTINUED | OUTPATIENT
Start: 2024-03-19 | End: 2024-03-19 | Stop reason: SURG

## 2024-03-19 RX ORDER — ONDANSETRON 4 MG/1
4 TABLET, FILM COATED ORAL EVERY 8 HOURS PRN
Qty: 12 TABLET | Refills: 0 | Status: SHIPPED | OUTPATIENT
Start: 2024-03-19

## 2024-03-19 RX ORDER — PREGABALIN 75 MG/1
150 CAPSULE ORAL ONCE
Status: COMPLETED | OUTPATIENT
Start: 2024-03-19 | End: 2024-03-19

## 2024-03-19 RX ORDER — HYDROCODONE BITARTRATE AND ACETAMINOPHEN 7.5; 325 MG/1; MG/1
1 TABLET ORAL EVERY 4 HOURS PRN
Qty: 42 TABLET | Refills: 0 | Status: SHIPPED | OUTPATIENT
Start: 2024-03-19

## 2024-03-19 RX ORDER — DIPHENHYDRAMINE HYDROCHLORIDE 50 MG/ML
12.5 INJECTION INTRAMUSCULAR; INTRAVENOUS
Status: DISCONTINUED | OUTPATIENT
Start: 2024-03-19 | End: 2024-03-19 | Stop reason: HOSPADM

## 2024-03-19 RX ORDER — LIDOCAINE HYDROCHLORIDE 20 MG/ML
INJECTION, SOLUTION INFILTRATION; PERINEURAL AS NEEDED
Status: DISCONTINUED | OUTPATIENT
Start: 2024-03-19 | End: 2024-03-19 | Stop reason: SURG

## 2024-03-19 RX ORDER — HYDROCODONE BITARTRATE AND ACETAMINOPHEN 5; 325 MG/1; MG/1
1 TABLET ORAL ONCE AS NEEDED
Status: DISCONTINUED | OUTPATIENT
Start: 2024-03-19 | End: 2024-03-19 | Stop reason: HOSPADM

## 2024-03-19 RX ORDER — ROPIVACAINE HYDROCHLORIDE 5 MG/ML
INJECTION, SOLUTION EPIDURAL; INFILTRATION; PERINEURAL
Status: COMPLETED | OUTPATIENT
Start: 2024-03-19 | End: 2024-03-19

## 2024-03-19 RX ORDER — FENTANYL CITRATE 50 UG/ML
50 INJECTION, SOLUTION INTRAMUSCULAR; INTRAVENOUS ONCE AS NEEDED
Status: COMPLETED | OUTPATIENT
Start: 2024-03-19 | End: 2024-03-19

## 2024-03-19 RX ORDER — ONDANSETRON 2 MG/ML
4 INJECTION INTRAMUSCULAR; INTRAVENOUS ONCE AS NEEDED
Status: DISCONTINUED | OUTPATIENT
Start: 2024-03-19 | End: 2024-03-19 | Stop reason: HOSPADM

## 2024-03-19 RX ORDER — ASPIRIN 81 MG/1
81 TABLET ORAL ONCE
Status: DISCONTINUED | OUTPATIENT
Start: 2024-03-19 | End: 2024-03-19 | Stop reason: HOSPADM

## 2024-03-19 RX ORDER — FENTANYL CITRATE 50 UG/ML
25 INJECTION, SOLUTION INTRAMUSCULAR; INTRAVENOUS
Status: DISCONTINUED | OUTPATIENT
Start: 2024-03-19 | End: 2024-03-19 | Stop reason: HOSPADM

## 2024-03-19 RX ORDER — ONDANSETRON 4 MG/1
4 TABLET, ORALLY DISINTEGRATING ORAL ONCE AS NEEDED
Status: DISCONTINUED | OUTPATIENT
Start: 2024-03-19 | End: 2024-03-19 | Stop reason: HOSPADM

## 2024-03-19 RX ORDER — SODIUM CHLORIDE 0.9 % (FLUSH) 0.9 %
3 SYRINGE (ML) INJECTION EVERY 12 HOURS SCHEDULED
Status: DISCONTINUED | OUTPATIENT
Start: 2024-03-19 | End: 2024-03-19 | Stop reason: HOSPADM

## 2024-03-19 RX ORDER — PROMETHAZINE HYDROCHLORIDE 25 MG/1
25 SUPPOSITORY RECTAL ONCE AS NEEDED
Status: DISCONTINUED | OUTPATIENT
Start: 2024-03-19 | End: 2024-03-19 | Stop reason: HOSPADM

## 2024-03-19 RX ORDER — MAGNESIUM HYDROXIDE 1200 MG/15ML
LIQUID ORAL AS NEEDED
Status: DISCONTINUED | OUTPATIENT
Start: 2024-03-19 | End: 2024-03-19 | Stop reason: HOSPADM

## 2024-03-19 RX ORDER — MELOXICAM 15 MG/1
15 TABLET ORAL ONCE
Status: COMPLETED | OUTPATIENT
Start: 2024-03-19 | End: 2024-03-19

## 2024-03-19 RX ORDER — DEXAMETHASONE SODIUM PHOSPHATE 4 MG/ML
INJECTION, SOLUTION INTRA-ARTICULAR; INTRALESIONAL; INTRAMUSCULAR; INTRAVENOUS; SOFT TISSUE
Status: COMPLETED | OUTPATIENT
Start: 2024-03-19 | End: 2024-03-19

## 2024-03-19 RX ORDER — DOCUSATE SODIUM 100 MG/1
100 CAPSULE, LIQUID FILLED ORAL 2 TIMES DAILY
Qty: 60 CAPSULE | Refills: 0 | Status: SHIPPED | OUTPATIENT
Start: 2024-03-19

## 2024-03-19 RX ORDER — PROMETHAZINE HYDROCHLORIDE 25 MG/1
25 TABLET ORAL ONCE AS NEEDED
Status: DISCONTINUED | OUTPATIENT
Start: 2024-03-19 | End: 2024-03-19 | Stop reason: HOSPADM

## 2024-03-19 RX ORDER — HYDROMORPHONE HYDROCHLORIDE 1 MG/ML
0.25 INJECTION, SOLUTION INTRAMUSCULAR; INTRAVENOUS; SUBCUTANEOUS
Status: DISCONTINUED | OUTPATIENT
Start: 2024-03-19 | End: 2024-03-19 | Stop reason: HOSPADM

## 2024-03-19 RX ORDER — NALOXONE HCL 0.4 MG/ML
0.2 VIAL (ML) INJECTION AS NEEDED
Status: DISCONTINUED | OUTPATIENT
Start: 2024-03-19 | End: 2024-03-19 | Stop reason: HOSPADM

## 2024-03-19 RX ORDER — PHENYLEPHRINE HCL IN 0.9% NACL 1 MG/10 ML
SYRINGE (ML) INTRAVENOUS AS NEEDED
Status: DISCONTINUED | OUTPATIENT
Start: 2024-03-19 | End: 2024-03-19 | Stop reason: SURG

## 2024-03-19 RX ORDER — SODIUM CHLORIDE 0.9 % (FLUSH) 0.9 %
3-10 SYRINGE (ML) INJECTION AS NEEDED
Status: DISCONTINUED | OUTPATIENT
Start: 2024-03-19 | End: 2024-03-19 | Stop reason: HOSPADM

## 2024-03-19 RX ORDER — FLUMAZENIL 0.1 MG/ML
0.2 INJECTION INTRAVENOUS AS NEEDED
Status: DISCONTINUED | OUTPATIENT
Start: 2024-03-19 | End: 2024-03-19 | Stop reason: HOSPADM

## 2024-03-19 RX ORDER — TRANEXAMIC ACID 100 MG/ML
INJECTION, SOLUTION INTRAVENOUS AS NEEDED
Status: DISCONTINUED | OUTPATIENT
Start: 2024-03-19 | End: 2024-03-19 | Stop reason: SURG

## 2024-03-19 RX ORDER — FENTANYL CITRATE 50 UG/ML
INJECTION, SOLUTION INTRAMUSCULAR; INTRAVENOUS AS NEEDED
Status: DISCONTINUED | OUTPATIENT
Start: 2024-03-19 | End: 2024-03-19 | Stop reason: SURG

## 2024-03-19 RX ORDER — PROPOFOL 10 MG/ML
VIAL (ML) INTRAVENOUS AS NEEDED
Status: DISCONTINUED | OUTPATIENT
Start: 2024-03-19 | End: 2024-03-19 | Stop reason: SURG

## 2024-03-19 RX ORDER — ONDANSETRON 2 MG/ML
INJECTION INTRAMUSCULAR; INTRAVENOUS AS NEEDED
Status: DISCONTINUED | OUTPATIENT
Start: 2024-03-19 | End: 2024-03-19 | Stop reason: SURG

## 2024-03-19 RX ADMIN — HYDROCODONE BITARTRATE AND ACETAMINOPHEN 1 TABLET: 7.5; 325 TABLET ORAL at 17:51

## 2024-03-19 RX ADMIN — FENTANYL CITRATE 50 MCG: 50 INJECTION, SOLUTION INTRAMUSCULAR; INTRAVENOUS at 15:45

## 2024-03-19 RX ADMIN — ROCURONIUM BROMIDE 50 MG: 10 INJECTION, SOLUTION INTRAVENOUS at 15:46

## 2024-03-19 RX ADMIN — ONDANSETRON 4 MG: 2 INJECTION INTRAMUSCULAR; INTRAVENOUS at 17:23

## 2024-03-19 RX ADMIN — ACETAMINOPHEN 325MG 975 MG: 325 TABLET ORAL at 13:12

## 2024-03-19 RX ADMIN — VANCOMYCIN HYDROCHLORIDE 1000 MG: 1 INJECTION, POWDER, LYOPHILIZED, FOR SOLUTION INTRAVENOUS at 15:07

## 2024-03-19 RX ADMIN — TRANEXAMIC ACID 1000 MG: 100 INJECTION INTRAVENOUS at 17:05

## 2024-03-19 RX ADMIN — Medication 100 MCG: at 17:11

## 2024-03-19 RX ADMIN — CEFAZOLIN 2 G: 2 INJECTION, POWDER, FOR SOLUTION INTRAVENOUS at 15:37

## 2024-03-19 RX ADMIN — MIDAZOLAM 1 MG: 1 INJECTION INTRAMUSCULAR; INTRAVENOUS at 13:26

## 2024-03-19 RX ADMIN — LIDOCAINE HYDROCHLORIDE 60 MG: 20 INJECTION, SOLUTION EPIDURAL; INFILTRATION; INTRACAUDAL; PERINEURAL at 15:45

## 2024-03-19 RX ADMIN — Medication 100 MCG: at 15:58

## 2024-03-19 RX ADMIN — DEXAMETHASONE SODIUM PHOSPHATE 8 MG: 4 INJECTION, SOLUTION INTRAMUSCULAR; INTRAVENOUS at 15:52

## 2024-03-19 RX ADMIN — MELOXICAM 15 MG: 15 TABLET ORAL at 13:12

## 2024-03-19 RX ADMIN — PROPOFOL 120 MG: 10 INJECTION, EMULSION INTRAVENOUS at 15:45

## 2024-03-19 RX ADMIN — PREGABALIN 150 MG: 75 CAPSULE ORAL at 13:12

## 2024-03-19 RX ADMIN — Medication 100 MCG: at 16:14

## 2024-03-19 RX ADMIN — ROPIVACAINE HYDROCHLORIDE 20 ML: 5 INJECTION EPIDURAL; INFILTRATION; PERINEURAL at 13:34

## 2024-03-19 RX ADMIN — Medication 100 MCG: at 16:04

## 2024-03-19 RX ADMIN — SODIUM CHLORIDE, POTASSIUM CHLORIDE, SODIUM LACTATE AND CALCIUM CHLORIDE 9 ML/HR: 600; 310; 30; 20 INJECTION, SOLUTION INTRAVENOUS at 13:40

## 2024-03-19 RX ADMIN — Medication 100 MCG: at 16:09

## 2024-03-19 RX ADMIN — SODIUM CHLORIDE, POTASSIUM CHLORIDE, SODIUM LACTATE AND CALCIUM CHLORIDE 500 ML: 600; 310; 30; 20 INJECTION, SOLUTION INTRAVENOUS at 13:28

## 2024-03-19 RX ADMIN — Medication 100 MCG: at 16:52

## 2024-03-19 RX ADMIN — Medication 100 MCG: at 17:19

## 2024-03-19 RX ADMIN — DEXAMETHASONE SODIUM PHOSPHATE 4 MG: 4 INJECTION, SOLUTION INTRA-ARTICULAR; INTRALESIONAL; INTRAMUSCULAR; INTRAVENOUS; SOFT TISSUE at 13:34

## 2024-03-19 RX ADMIN — SUGAMMADEX 200 MG: 100 INJECTION, SOLUTION INTRAVENOUS at 17:23

## 2024-03-19 RX ADMIN — FAMOTIDINE 20 MG: 10 INJECTION INTRAVENOUS at 13:41

## 2024-03-19 RX ADMIN — SODIUM CHLORIDE, POTASSIUM CHLORIDE, SODIUM LACTATE AND CALCIUM CHLORIDE: 600; 310; 30; 20 INJECTION, SOLUTION INTRAVENOUS at 15:41

## 2024-03-19 RX ADMIN — HYDROMORPHONE HYDROCHLORIDE 0.25 MG: 1 INJECTION, SOLUTION INTRAMUSCULAR; INTRAVENOUS; SUBCUTANEOUS at 17:41

## 2024-03-19 RX ADMIN — FENTANYL CITRATE 50 MCG: 50 INJECTION, SOLUTION INTRAMUSCULAR; INTRAVENOUS at 13:26

## 2024-03-19 RX ADMIN — HYDROMORPHONE HYDROCHLORIDE 0.25 MG: 1 INJECTION, SOLUTION INTRAMUSCULAR; INTRAVENOUS; SUBCUTANEOUS at 17:52

## 2024-03-19 RX ADMIN — Medication 100 MCG: at 16:02

## 2024-03-19 NOTE — THERAPY EVALUATION
Patient Name: Berenice Granger  : 1960    MRN: [id number]                              Today's Date: 3/19/2024       Admit Date: 3/19/2024    Visit Dx:     ICD-10-CM ICD-9-CM   1. Arthritis of knee  M17.10 716.96     Patient Active Problem List   Diagnosis    Arthritis of right knee    Arthritis of knee     Past Medical History:   Diagnosis Date    Arthritis of back ?    CTS (carpal tunnel syndrome)     Corisone Injection in both wrists    Disease of thyroid gland     Dry senile macular degeneration     GERD (gastroesophageal reflux disease)     Hyperlipidemia     Hypothyroidism     Knee swelling     TAE KNEES. HISTORY OF TORN MENISCUS IN TAE KNEES    Lower back pain     Osteoarthritis (arthritis due to wear and tear of joints)     Seasonal allergies     Spastic colon     Tear of meniscus of knee 2020    Both knees     Past Surgical History:   Procedure Laterality Date    COLONOSCOPY      KNEE ARTHROSCOPY      TAE KNEES    LAPAROSCOPIC CHOLECYSTECTOMY      UTERINE FIBROID EMBOLIZATION      WISDOM TOOTH EXTRACTION        General Information       Row Name 24 1416          Physical Therapy Time and Intention    Document Type evaluation  -     Mode of Treatment individual therapy;physical therapy  -       Row Name 24 1416          General Information    Patient Profile Reviewed yes  -     Prior Level of Function independent:  -     Existing Precautions/Restrictions fall  -     Barriers to Rehab previous functional deficit  -       Row Name 24 1416          Living Environment    People in Home spouse  -       Row Name 24 1416          Home Main Entrance    Number of Stairs, Main Entrance four  -     Stair Railings, Main Entrance railings safe and in good condition  -       Row Name 24 1416          Cognition    Orientation Status (Cognition) oriented x 4  -KH               User Key  (r) = Recorded By, (t) = Taken By, (c) = Cosigned By      Initials  Name Provider Type    Cristina Acosta, PT Physical Therapist                   Mobility    No documentation.                  Obj/Interventions       [facility] Name 03/19/24 1416          Range of Motion Comprehensive    Comment, General Range of Motion WFL  -[facility] Name 03/19/24 1416          Strength Comprehensive (MMT)    Comment, General Manual Muscle Testing (MMT) Assessment WFL except RLE- already had nerve block  -[facility] Name 03/19/24 1416          Motor Skills    Therapeutic Exercise --  TKA protocol x 10 reps -pt demonstrated with LLE  -               User Key  (r) = Recorded By, (t) = Taken By, (c) = Cosigned By      Initials Name Provider Type    Cristina Acosta, PT Physical Therapist                   Goals/Plan    No documentation.                  Clinical Impression       [facility] Name 03/19/24 1417          Pain    Pretreatment Pain Rating 0/10 - no pain  -     Posttreatment Pain Rating 0/10 - no pain  -[facility] Name 03/19/24 1417          Plan of Care Review    Plan of Care Reviewed With patient;spouse  -     Outcome Evaluation Pt was evaluated preoperatively and PT provided teaching prior to her TKA today. Pt is scheduled for a R TKA. Pt and spouse were educated on TKA HEP, use of Rwx, sequence of gait with Rwx, stair training and use of gait belt. Pt demonstrated independence and understanding with HEP. Gait and stair training were not practiced as pt had received nerve block. PT demonstrated gait sequence and stair training. Pt observed and verbalized understanding. Spouse was present and verbalized understanding of all skills. All questions were answered. Pt will mobilize with nursing staff after surgery. Pt plans to d/c home later today with spouse and HH PT will follow at home. Pt will sign off  -KH       Row Name 03/19/24 1417          Therapy Assessment/Plan (PT)    Patient/Family Therapy Goals Statement (PT) return home  -     Therapy Frequency (PT)  evaluation only  -       Row Name 03/19/24 1417          Positioning and Restraints    Pre-Treatment Position in bed  -KH     Post Treatment Position bed  -KH     In Bed fowlers;call light within reach;encouraged to call for assist;with family/caregiver;with ns  -               User Key  (r) = Recorded By, (t) = Taken By, (c) = Cosigned By      Initials Name Provider Type    Cristina Acosta PT Physical Therapist                   Outcome Measures    No documentation.                                  PT Recommendation and Plan     Plan of Care Reviewed With: patient, spouse  Outcome Evaluation: Pt was evaluated preoperatively and PT provided teaching prior to her TKA today. Pt is scheduled for a R TKA. Pt and spouse were educated on TKA HEP, use of Rwx, sequence of gait with Rwx, stair training and use of gait belt. Pt demonstrated independence and understanding with HEP. Gait and stair training were not practiced as pt had received nerve block. PT demonstrated gait sequence and stair training. Pt observed and verbalized understanding. Spouse was present and verbalized understanding of all skills. All questions were answered. Pt will mobilize with nursing staff after surgery. Pt plans to d/c home later today with spouse and HH PT will follow at home. Pt will sign off     Time Calculation:         PT Charges       Row Name 03/19/24 1415             Time Calculation    Start Time 1355  -KH      Stop Time 1410  -KH      Time Calculation (min) 15 min  -KH         Untimed Charges    PT Eval/Re-eval Minutes 15  -KH         Total Minutes    Untimed Charges Total Minutes 15  -KH       Total Minutes 15  -KH                User Key  (r) = Recorded By, (t) = Taken By, (c) = Cosigned By      Initials Name Provider Type    Cristina Acosta PT Physical Therapist                  Therapy Charges for Today       Code Description Service Date Service Provider Modifiers Qty    [id number]  PT EVAL MOD  COMPLEXITY 1 3/19/2024 Cristina Ware, PT GP 1               PT Discharge Summary  Anticipated Discharge Disposition (PT): home with assist, home with home health    Cristina Ware, PT  3/19/2024

## 2024-03-19 NOTE — TELEPHONE ENCOUNTER
Caller: JIMBO ALMONTE    Relationship: Other    Best call back number: [phone]        What was the call regarding: JIMBO ALMONTE FROM THE HOSPITAL WOULD LIKE A CALL TO GET PATIENT SET UP, SHE IS IN PRE OP NOW, PLANS TO GO HOME TODAY, PATIENT WANTED HOME HEALTH BUT IT IS NOT AN OPTION WITH INSURANCE

## 2024-03-19 NOTE — TELEPHONE ENCOUNTER
Patient arrived to unit without complaint  Patient tolerated chemotherapy without incident  AVS declined and patient aware of next appointment  Patient left in stable condition  Was able to speak with PT and [place] and got Ms. Granger scheduled for her OP PT evaluation 3/20 @ 11 am. She is to bring her ID and insurance card and dress comfortable as the eval is an hour long. She is to arrive 10-15 minutes early to complete the paperwork. I have passed this information to the [facility]SC team and the patient should be getting confirmation from PT in My Chart. Dr. Beltran has been notified.

## 2024-03-19 NOTE — TELEPHONE ENCOUNTER
Received a call from [facility] that we were unable to find HH PT for Ms. Granger. Multiple places were tried and declined. We have opted to try to set up OP PT. Ms. Granger said she planted to use [facility]. I have called OP PT to try to get an appointment set up. Spoke with scheduling, she attempted to reach the  and was unsuccessful. I have left my call back number for them to reach me to get her OP PT arranged. MD made aware of inability to receive HH PT.

## 2024-03-19 NOTE — PLAN OF CARE
Goal Outcome Evaluation:  Plan of Care Reviewed With: patient, spouse           Outcome Evaluation: Pt was evaluated preoperatively and PT provided teaching prior to her TKA today. Pt is scheduled for a R TKA. Pt and spouse were educated on TKA HEP, use of Rwx, sequence of gait with Rwx, stair training and use of gait belt. Pt demonstrated independence and understanding with HEP. Gait and stair training were not practiced as pt had received nerve block. PT demonstrated gait sequence and stair training. Pt observed and verbalized understanding. Spouse was present and verbalized understanding of all skills. All questions were answered. Pt will mobilize with nursing staff after surgery. Pt plans to d/c home later today with spouse and HH PT will follow at home. Pt will sign off      Anticipated Discharge Disposition (PT): home with assist, home with home health

## 2024-03-19 NOTE — OP NOTE
Orthopaedic Operative Note    Facility: [facility]    Patient: Berenice Granger    Medical Record Number: [id number]    YOB: 1960    Dictating Surgeon: Celestine Beltran M.D.*    Primary Care Physician: Bridget Paige MD    Date of Operation: 3/19/2024    Pre-Operative Diagnosis: Right knee medial compartment osteoarthritis    Post-Operative Diagnosis: Right knee medial compartment osteoarthritis    Procedure Performed: Right knee uni-compartment arthroplasty    Surgeon: Celestine Beltran MD     Assistant: Elvia Samayoa whose assistance was critical for help with patient positioning, suctioning and irrigation, retraction, manipulation of the extremity for insertion of the implants, wound closure and application of the bandages.  Her assistance was critical to the success of this case.      Anesthesia: general    Complications: None.     Estimated Blood Loss: Less than 50 mL.     Implants: [employer] Journey II size 4 femoral component, size 6 tibial component with 8 mm polyethylene insert    Specimens: * No orders in the log *    Brief Operative Indication:  Ms. Granger had a long history of progressively worsening isolated right knee medial compartment osteoarthritis.  The risk, benefits and alternatives to a uni-compartment arthroplasty were carefully discussed with her in detail.  I did explain the possible need for a total knee arthroplasty at the time of surgery, if either the lateral or medial compartments demonstrated significant involvement or if there were compromise of the ACL.  We also discussed potential need for conversion to a total knee in the future.  She acknowledged understanding the information and consented to proceed.    Description of the procedure in detail:  The patient and operative site were identified in the preoperative holding area.  The surgical site was marked.  The patient was taken to the operating room.  Adequate general anesthesia was  administered and then the patient was repositioned on the operating table.  The right lower extremity was prepped and draped in the standard sterile fashion.  I cleaned the extremity with an alcohol solution.  The leg was prepped with Hibiclens solution and then 2 ChloraPrep preps.  Iallowed the ChloraPrep to dry for 2 minutes before the draping procedure was carried out.    The leg was exsanguinated with an Esmarch bandage and the tourniquet insufflated.  A timeout was taken and preoperative antibiotics administered prior to incision.  I fashioned an 8 cm incision over the anterior aspect of the knee.  This was carried down through the skin and subcutaneous tissues.      A standard medial parapatellar arthrotomy was performed and the joint was entered.  At this point, the entirety of the joint was carefully inspected.  I began by removing the infrapatellar fat pad to allow for better visualization.  Once this was removed, all 3 compartments were carefully inspected.  There appeared to be some low-grade chondromalacia of the central ridge and medial facet of the patella as well as the trochlea.  There were no high-grade chondromalacia or full-thickness chondral defects.  Overall, the patellofemoral and lateral compartments appeared to be well preserved and I did not consider that the minor amount of patellofemoral chondromalacia precluded her from having a unicompartment arthroplasty.  There was no significant lateral meniscal pathology noted.  Her ACL and PCL were intact.  The medial compartment did demonstrate extensive degenerative change, as expected.  At this point, I determined that we could proceed with the medial compartment arthroplasty.    The anterior horn of the medial meniscus was carefully released and the medial compartment entered.  Retractors were appropriately positioned to keep the ACL, MCL and lateral sided structures protected.  The anteromedial soft tissues were carefully elevated up off of the  medial tibia to allow for better exposure, again exercising great caution to keep the MCL protected.    The cutting guide for the proximal tibia cut was carefully positioned.  This was properly aligned and then pinned into position.  The sagittal cut just adjacent to the ACL was then carried out.  The sawblade was left in place to keep the ACL protected.  The oscillating saw was then used to carry out the proximal tibia cut.  The cut portion of the bone was removed.      Next, the knee was taken out into extension.  The distal femoral cutting block was positioned and then pinned into place.  With this guide in proper position, the distal femoral cut was carried out.  A lamina  was inserted and then the medial meniscus carefully removed.      Next, I sized the distal femur.  The appropriate size guide was pinned into place and then the anterior, posterior and chamfer cuts carried out.  The cut portions of bone were carefully removed.  At this point, the posterior and medial capsular tissues were infiltrated with some of the Exparel solution.    I completed the proximal tibia and femoral sided preparations.  I trialed with a size 4 femur and a size 6 tibia.  These implants seem to fit well.  The knee demonstrated good motion and stability with a 8 mm trial polyethylene implant.  The trial components were then removed and the final preparations completed.    My assistant mixed one batch of Palacos bone cement on the back table using current generation cement mixing technique.  The appropriate size implants were impacted into position I took care to remove the excess, extruded cement.  The knee was taken out into full extension with the trial polyethylene implant in place and the 2 mm spacer between the implants.    The knee was left in full extension until the cement had fully cured.  Once the cement was hardened, I remove the spacer and then checked the motion and stability.  Again, both were excellent.  The  trial implant was removed and the final component impacted in the position.  The seated well.  I again checked the motion and stability.  Again, both were excellent.    I then directed my attention to closure.  The wound was then irrigated out with 3 L of sterile saline.  The parapatellar arthrotomy was anatomically repaired using multiple 0 Vicryl sutures to reapproximate the tissues and then a running Strata-fix suture to complete the repair.  0 Vicryl was used to repair the subcutaneous tissues.  A subcuticular Monocryl stitch followed by a Zipline adhesive were used to close the skin.  Sterile dressings were applied and the drapes withdrawn.  The patient was awakened and taken to recovery room.  Ms. Granger tolerated the procedure well.  There were no complications.    Celestine Beltran MD  03/19/24

## 2024-03-19 NOTE — CASE MANAGEMENT/SOCIAL WORK
Continued Stay Note  [facility]     Patient Name: Berenice Granger  MRN: [id number]  Today's Date: 3/19/2024    Admit Date: 3/19/2024    Plan: Attempted to get HH and no one would accept her insurance.  She will go home and do outpatient PT   Discharge Plan       Row Name 03/19/24 5940       Plan    Plan Attempted to get HH and no one would accept her insurance.  She will go home and do outpatient PT                   Discharge Codes    No documentation.                       Shannon Epley, RN

## 2024-03-19 NOTE — ANESTHESIA PREPROCEDURE EVALUATION
Anesthesia Evaluation     NPO Solid Status: > 8 hours  NPO Liquid Status: > 8 hours           Airway   Mallampati: I  No difficulty expected  Dental      Pulmonary    Cardiovascular   Exercise tolerance: good (4-7 METS)    (+) hyperlipidemia      Neuro/Psych  (+) numbness  GI/Hepatic/Renal/Endo    (+) GERD, thyroid problem     Musculoskeletal     Abdominal    Substance History      OB/GYN          Other   arthritis,                 Anesthesia Plan    ASA 3     general     (Regional for POPC PSR  )  intravenous induction     Anesthetic plan, risks, benefits, and alternatives have been provided, discussed and informed consent has been obtained with: patient.    CODE STATUS:

## 2024-03-19 NOTE — ANESTHESIA PROCEDURE NOTES
Peripheral Block      Patient reassessed immediately prior to procedure    Patient location during procedure: pre-op  Start time: 3/19/2024 1:30 PM  Stop time: 3/19/2024 1:34 PM  Reason for block: procedure for pain, at surgeon's request and post-op pain management  Performed by  Anesthesiologist: Hola Champagne MD  Preanesthetic Checklist  Completed: patient identified, IV checked, site marked, risks and benefits discussed, surgical consent, monitors and equipment checked, pre-op evaluation and timeout performed  Prep:  Pt Position: supine  Sterile barriers:cap, gloves, sterile barriers, washed/disinfected hands and mask  Prep: ChloraPrep  Patient monitoring: blood pressure monitoring, continuous pulse oximetry and EKG  Procedure    Sedation: yes  Performed under: local infiltration  Guidance:ultrasound guided  Images:still images obtained, printed/placed on chart    Laterality:right  Block Type:adductor canal block  Injection Technique:single-shot  Needle Type:echogenic  Resistance on Injection: none    Medications Used: dexamethasone (DECADRON) injection - Injection   4 mg - 3/19/2024 1:34:00 PM  ropivacaine (NAROPIN) 0.5 % injection - Injection   20 mL - 3/19/2024 1:34:00 PM      Post Assessment  Injection Assessment: negative aspiration for heme, no paresthesia on injection and incremental injection  Patient Tolerance:comfortable throughout block  Complications:no  Additional Notes  USG used to verify needle placement and medication administration

## 2024-03-19 NOTE — DISCHARGE PLACEMENT REQUEST
"Dallas Patel (63 y.o. Female)       Date of Birth   1960    Social Security Number       Address   [address] [place] [facility]    Home Phone   [phone]    MRN   [id number]       Judaism   Pentecostal    Marital Status                               Admission Date       Admission Type   Elective    Admitting Provider   Celestine Beltran MD    Attending Provider   Celestine Beltran MD    Department, Room/Bed   [facility], --/--       Discharge Date       Discharge Disposition       Discharge Destination                                 Attending Provider: Celestine Beltran MD    Allergies: Latex, Penicillins    Isolation: None   Infection: None   Code Status: Not on file    Ht: 167.6 cm (66\")   Wt: 67 kg (147 lb 9.6 oz)    Admission Cmt: None   Principal Problem: Arthritis of knee [M17.10]                   Active Insurance as of 3/19/2024       Primary Coverage       Payor Plan Insurance Group Employer/Plan Group    AMB[employer]TER [employer] IND EXCHANGE AM[employer] IND EXCHANGE        Payor Plan Address Payor Plan Phone Number Payor Plan Fax Number Effective Dates    [address] [phone]  1/1/2024 - None Entered    [facility] [address]         Subscriber Name Subscriber Birth Date Member ID       DALLAS PATEL 1960 [id number]                     Emergency Contacts        (Rel.) Home Phone Work Phone Mobile Phone    LEIGHA PATEL (Spouse) [phone] -- --              "

## 2024-03-19 NOTE — BRIEF OP NOTE
TOTAL KNEE ARTHROPLASTY  Progress Note    Berenice Granger  3/19/2024    Pre-op Diagnosis:   Arthritis of knee [M17.10]       Post-Op Diagnosis Codes:     * Arthritis of knee [M17.10]    Procedure/CPT® Codes:        Procedure(s):  Unicompartment knee arthroplasty    Surgical Approach: Knee Medial Parapatellar            Surgeon(s):  Celestine Beltran MD    Anesthesia: General with Block    Staff:   Circulator: Isi Solorzano RN; Maria Elena Heredia RN; Nacho Gunter RN  Scrub Person: Socorro Lemus Stephanie A  Vendor Representative: Ezequiel Carolina  Assistant: Elvia Samayoa CSA  Assistant: Elvia Samayoa CSA      Estimated Blood Loss: minimal    Urine Voided: * No values recorded between 3/19/2024  3:40 PM and 3/19/2024  5:08 PM *    Specimens:                None          Drains: * No LDAs found *    Findings: see dictation        Complications: none    Assistant: Elvia Samayoa CSA  was responsible for performing the following activities: Retraction and their skilled assistance was necessary for the success of this case.    Celestine Beltran MD     Date: 3/19/2024  Time: 17:15 EDT

## 2024-03-19 NOTE — ANESTHESIA POSTPROCEDURE EVALUATION
Patient: Berenice Granger    Procedure Summary       Date: 03/19/24 Room / Location:  [facility] [address] [place] [address] [facility]    Anesthesia Start: 1540 Anesthesia Stop: 1734    Procedure: Unicompartment knee arthroplasty (Right: Knee) Diagnosis:       Arthritis of knee      (Arthritis of knee [M17.10])    Surgeons: Celestine Beltran MD Provider: Callie Oswald MD    Anesthesia Type: general ASA Status: 3            Anesthesia Type: general    Vitals  Vitals Value Taken Time   /69 03/19/24 1815   Temp 36.6 °C (97.9 °F) 03/19/24 1735   Pulse 97 03/19/24 1819   Resp 16 03/19/24 1800   SpO2 100 % 03/19/24 1819   Vitals shown include unfiled device data.        Post Anesthesia Care and Evaluation    Patient location during evaluation: bedside  Patient participation: complete - patient participated  Level of consciousness: awake and alert  Pain management: adequate    Airway patency: patent  Anesthetic complications: No anesthetic complications    Cardiovascular status: acceptable  Respiratory status: acceptable  Hydration status: acceptable    Comments: /73   Pulse 90   Temp 36.6 °C (97.9 °F) (Oral)   Resp 16   SpO2 95%

## 2024-03-19 NOTE — DISCHARGE INSTRUCTIONS
You may take  a pain pill at 950            What to expect after a Nerve Block    Nerve blocks administered to block pain affect many types of nerves, including those nerves that control movement, pain, and normal sensation. Following a nerve block, you may notice some bruising at the site where the block was given. You may experience sensations such as: numbness of the affected area or limb, tingling, heaviness (that is the limb feels heavy to you), weakness or inability to move the affected arm or leg, or a feeling as if your arm or leg has “fallen asleep.”     A nerve block can last from 2 to 36 hours depending on the medications used.  Usually the weakness wears off first followed by the tingling and heaviness. As the block wears off, you may begin to notice pain; however, this sequence of events may occur in any order. Typically, you will be able to move your limb before you will feel it. Once a nerve block begins to wear off, the effects are usually completely gone within 60 minutes.  If you experience continued side effects that you believe are block related for longer than 48 hours, please call your healthcare provider. Please see block-specific instructions below.    Instructions for any Block involving the leg/foot:   If you have had a leg /foot block, you should not bear weight on the affected leg until the block has worn off. After the block has worn off, weight bearing should be as directed by your surgeon. You may be sent home with crutches. You are at high risk for falling because of the anesthetic effects on your leg. Please use caution when standing or trying to move or walk. Have someone assist you until your leg and foot function have returned to normal.     Protection of a “blocked” limb  After a nerve block, you cannot feel pain, pressure, or extremes of temperature in the affected limb. And because of this, your blocked limb is at more risk for injury. For example, it is possible to burn your  limb on an extremely hot surface without feeling it.     When resting, it is important to reposition your limb periodically to avoid prolonged pressure on it. This may require the use of pillows and padding.    While sleeping, you should avoid rolling onto the affected limb or putting too much pressure on it.     If you have a cast or tight dressing, check the color of your fingers or toes of the affected limb. Call your surgeon if they look discolored (that is, dusky, dark colored).    Use caution in cold weather. Cover your limb appropriately to protect it from the cold.    Pain Management:  Your surgeon will give you a prescription for pain medication. Begin taking this before the nerve block wears off. Bear in mind that sometimes the block can wear off in the middle of the night.

## 2024-03-19 NOTE — ANESTHESIA PROCEDURE NOTES
Airway  Urgency: elective    Date/Time: 3/19/2024 3:48 PM  Airway not difficult    General Information and Staff    Patient location during procedure: OR  Anesthesiologist: Callie Oswald MD    Indications and Patient Condition  Indications for airway management: airway protection    Preoxygenated: yes  MILS maintained throughout  Mask difficulty assessment: 1 - vent by mask    Final Airway Details  Final airway type: endotracheal airway      Successful airway: ETT  Cuffed: yes   Successful intubation technique: direct laryngoscopy  Endotracheal tube insertion site: oral  Blade: Jed  Blade size: 3  ETT size (mm): 7.0  Cormack-Lehane Classification: grade IIa - partial view of glottis  Placement verified by: chest auscultation and capnometry   Measured from: teeth  Number of attempts at approach: 1  Assessment: lips, teeth, and gum same as pre-op and atraumatic intubation

## 2024-03-20 ENCOUNTER — TREATMENT (OUTPATIENT)
Dept: PHYSICAL THERAPY | Facility: CLINIC | Age: 64
End: 2024-03-20
Payer: COMMERCIAL

## 2024-03-20 DIAGNOSIS — R26.9 GAIT DISTURBANCE: ICD-10-CM

## 2024-03-20 DIAGNOSIS — Z47.89 ORTHOPEDIC AFTERCARE: ICD-10-CM

## 2024-03-20 DIAGNOSIS — M25.561 ACUTE PAIN OF RIGHT KNEE: Primary | ICD-10-CM

## 2024-03-20 PROCEDURE — 97161 PT EVAL LOW COMPLEX 20 MIN: CPT | Performed by: PHYSICAL THERAPIST

## 2024-03-20 PROCEDURE — 97110 THERAPEUTIC EXERCISES: CPT | Performed by: PHYSICAL THERAPIST

## 2024-03-20 NOTE — PROGRESS NOTES
Physical Therapy Initial Evaluation and Plan of Care    Patient: Berenice Granger   : 1960  Diagnosis/ICD-10 Code:  Acute pain of right knee [M25.561]  Referring practitioner: Celestine Beltran MD  Date of initial visit : 3/20/2024  Today's Date: 3/20/2024  Patient seen for 1  session    Visit Diagnoses:    ICD-10-CM ICD-9-CM   1. Acute pain of right knee  M25.561 719.46   2. Gait disturbance  R26.9 781.2   3. Orthopedic aftercare  Z47.89 V54.9        Subjective Evaluation    History of Present Illness  Date of surgery: 3/19/2024  Mechanism of injury: Patient is 63 year old female who presents status post right partial knee replacement on 3/19/24.  Reports prior history of partial menisectomy on both knees and injections and recent PRP injection which did not help.  Reports pain levels are low but surgical block has not fully worn off yet.  Reports has continued with HEP as instructed from inpatient PT.  Goals for return to prior level of function without pain including hiking, kayaking.     Subjective comment: Knee Outcome Survey - 38%  Patient Occupation: Retired Quality of life: good    Pain  Current pain ratin  At best pain ratin  Pain location: Right knee.  Quality: dull ache  Relieving factors: ice and medications  Aggravating factors: ambulation and prolonged positioning  Progression: improved           Objective          Active Range of Motion     Right Knee   Flexion: 80 degrees   Extensor la degrees     Additional Active Range of Motion Details  No evidence of infection or drainage with inspection of post operative dressing    Patellar Mobility     Right Knee Hypomobile in the medial, lateral, superior and inferior patellar tendon(s).     Strength/Myotome Testing     Right Knee   Flexion: 3+  Extension: 2+  Quadriceps contraction: poor    Ambulation     Comments   Ambulates with rolling walker with step to and intermittent step through pattern    Functional Assessment     Comments  TUG  - 16.5 seconds with rolling walker          Assessment & Plan       Assessment  Impairments: abnormal gait, abnormal or restricted ROM, impaired balance, impaired physical strength, lacks appropriate home exercise program and pain with function   Functional limitations: carrying objects, lifting, walking, pulling, pushing and standing (Stairs, squatting, kneeling)  Assessment details: Presents with limits in R knee ROM/strength, altered gait, decreased balance, pain and activity tolerance status post R partial knee replacement.  She would benefit from skilled PT to address the above and restore to highest level of prior function.   Prognosis: good    Goals  Plan Goals: ST. R knee AROM improved 5-110 over 2 weeks.   2. Gait mechanics improved with ability to safely transition to use of straight cane in home for gait over 2 weeks.   3. R knee pain decreased 20% over greater over 2 weeks.   4. Independent and compliant with HEP over 2 weeks.     LT. Knee Outcome Survey increased to 75% or greater over 12 weeks.   2. R knee strength 5/5 both EXT/FL over 12 weeks.   3. Five Time Sit to Stand and TUG both improved to age adjusted norms or less over 12 weeks.   4. Single leg balance R/L over 12 weeks to 5 seconds w/o UE support.   5. To verbalize readiness for discharge per improved function/pain/HEP independence over 12 weeks.     Plan  Therapy options: will be seen for skilled therapy services  Planned modality interventions: cryotherapy, electrical stimulation/Russian stimulation, TENS and thermotherapy (hydrocollator packs)  Planned therapy interventions: manual therapy, neuromuscular re-education, flexibility, soft tissue mobilization, functional ROM exercises, strengthening, gait training, stretching, home exercise program, therapeutic activities, transfer training and joint mobilization  Frequency: 2x week  Duration in weeks: 12    History # of Personal Factors and/or Comorbidities: MODERATE  (1-2)  Examination of Body System(s): # of elements: MODERATE (3)  Clinical Presentation: STABLE   Clinical Decision Making: LOW        Timed:         Manual Therapy:    2     mins  71295;     Therapeutic Exercise:    16     mins  70416;         Un-Timed:  Low Eval     25     Mins  96766      Timed Treatment:   18   mins   Total Treatment:     43   mins          PT SIGNATURE: Markell Cr, PT   [place] Lic #[id number]    DATE TREATMENT INITIATED: 3/20/2024    Initial Certification  Certification Period: 6/18/2024  I certify that the therapy services are furnished while this patient is under my care.  The services outlined above are required by this patient, and will be reviewed every 90 days.     PHYSICIAN: Celestine Beltran MD      DATE:     Please sign and return via fax to [phone].. Thank you, [facility].

## 2024-03-21 ENCOUNTER — TELEPHONE (OUTPATIENT)
Dept: ORTHOPEDIC SURGERY | Facility: HOSPITAL | Age: 64
End: 2024-03-21
Payer: COMMERCIAL

## 2024-03-21 NOTE — TELEPHONE ENCOUNTER
Post op day 2  Discharge Instructions:  Ask patient about his or her discharge instructions  ?  Patient confirmed understanding   []  Further instruction needed   What, if any, recommendations, teaching, or interventions did you provide? Click or tap here to enter text.  Health status:  Pain controlled Yes   Starting to get a handle on the pain. Having a lot more pain today.   Recommended interventions:  Yes  incision/dressing status   ?  Clean without redness, drainage, odor  []  Redness    []  Drainage - color Click or tap here to enter text.  []  Odor  MIGUEL - Green light blinking Yes  Difficulties urination No  Last BM 3/19/2024 (if no BM by day 3-recommend OTC suppository or fleets enema)  No BM, taking medications.   Medications:  ?Medications reviewed with patient/family/caregiver  Patient taking medications as prescribed?   Yes  If not taking medications as prescribed, note specific medicine(s) and reason for each:  Click or tap here to enter text.  Hospital Follow Up Plan:  Follow up Appointment with Orthopedic surgeon:  ?Has f/u appointment                []Scheduled f/u appointment  Home Care ordered at discharge?    No        Home Care started, or contact made?    No   If no, action taken: OP PT already stated…Unable to get HH PT  DME obtained/used in home?         Yes   Using IS  Choose an item.   Other information: Ms. Granger said she is doing ok. Today is a rough day, she is having quite a bit of pain. She is taking the pain medication regularly and has had to take some at 3 hours because the pain is so bad. She thinks she has a handle on it now. She wasn't able to get HH PT, we did get her scheduled for OP PT and she was able to go yesterday. She is doing the exercises, icing, and elevating. Dressing is good. No issues. No BM yet, taking the medication and eating fruits. Ms. Granger doesn't have any questions for me at this time. She has my contact information should she need anything.

## 2024-03-26 ENCOUNTER — TREATMENT (OUTPATIENT)
Dept: PHYSICAL THERAPY | Facility: CLINIC | Age: 64
End: 2024-03-26
Payer: COMMERCIAL

## 2024-03-26 DIAGNOSIS — R26.9 GAIT DISTURBANCE: ICD-10-CM

## 2024-03-26 DIAGNOSIS — Z47.89 ORTHOPEDIC AFTERCARE: ICD-10-CM

## 2024-03-26 DIAGNOSIS — M25.561 ACUTE PAIN OF RIGHT KNEE: Primary | ICD-10-CM

## 2024-03-26 PROCEDURE — 97110 THERAPEUTIC EXERCISES: CPT | Performed by: PHYSICAL THERAPIST

## 2024-03-26 PROCEDURE — 97112 NEUROMUSCULAR REEDUCATION: CPT | Performed by: PHYSICAL THERAPIST

## 2024-03-26 PROCEDURE — 97140 MANUAL THERAPY 1/> REGIONS: CPT | Performed by: PHYSICAL THERAPIST

## 2024-03-26 NOTE — PROGRESS NOTES
Physical Therapy Daily Treatment Note  Visit: 2    Berenice Granger reports: still having difficulty moving her right leg especially straightening her knee or lifting her right leg.  Has done HEP as instructed.   YOB: 1960  Referring practitioner : Celestine Beltran MD  Date of Initial: Type: THERAPY  Noted: 3/20/2024  Today's date: 3/26/2024  Patient seen for 2 sessions    Visit Diagnoses:    ICD-10-CM ICD-9-CM   1. Acute pain of right knee  M25.561 719.46   2. Gait disturbance  R26.9 781.2   3. Orthopedic aftercare  Z47.89 V54.9       Subjective       Objective     L knee FL AROM 90 AAROM end of visit  See Exercise, Manual, and Modality Logs for complete treatment.       Assessment/Plan    Limited but improved R quad contraction and active knee EXT.  Better ability with use of NMES to R quad and improved gait mechanics and knee FL end of tx.     Plan:    Continue            Timed:         Manual Therapy:    8     mins  53894;     Therapeutic Exercise:    22     mins  66195;     Neuromuscular Lobo:    10    mins  54459;          Timed Treatment:   40   mins   Total Treatment:     40   mins         Markell Cr PT, DPT  Physical Therapist  [place] Lic #[id number]

## 2024-03-28 ENCOUNTER — TREATMENT (OUTPATIENT)
Dept: PHYSICAL THERAPY | Facility: CLINIC | Age: 64
End: 2024-03-28
Payer: COMMERCIAL

## 2024-03-28 DIAGNOSIS — M25.561 ACUTE PAIN OF RIGHT KNEE: Primary | ICD-10-CM

## 2024-03-28 DIAGNOSIS — Z47.89 ORTHOPEDIC AFTERCARE: ICD-10-CM

## 2024-03-28 DIAGNOSIS — R26.9 GAIT DISTURBANCE: ICD-10-CM

## 2024-03-28 PROCEDURE — 97110 THERAPEUTIC EXERCISES: CPT | Performed by: PHYSICAL THERAPIST

## 2024-03-28 PROCEDURE — 97140 MANUAL THERAPY 1/> REGIONS: CPT | Performed by: PHYSICAL THERAPIST

## 2024-03-28 PROCEDURE — 97112 NEUROMUSCULAR REEDUCATION: CPT | Performed by: PHYSICAL THERAPIST

## 2024-03-29 ENCOUNTER — OFFICE VISIT (OUTPATIENT)
Dept: ORTHOPEDIC SURGERY | Facility: CLINIC | Age: 64
End: 2024-03-29
Payer: COMMERCIAL

## 2024-03-29 VITALS — TEMPERATURE: 97.1 F | BODY MASS INDEX: 23.63 KG/M2 | WEIGHT: 147 LBS | HEIGHT: 66 IN

## 2024-03-29 DIAGNOSIS — Z96.651 S/P TKR (TOTAL KNEE REPLACEMENT), RIGHT: Primary | ICD-10-CM

## 2024-03-29 DIAGNOSIS — M17.12 ARTHRITIS OF LEFT KNEE: ICD-10-CM

## 2024-03-29 RX ORDER — LIDOCAINE HYDROCHLORIDE 10 MG/ML
2 INJECTION, SOLUTION EPIDURAL; INFILTRATION; INTRACAUDAL; PERINEURAL
Status: COMPLETED | OUTPATIENT
Start: 2024-03-29 | End: 2024-03-29

## 2024-03-29 RX ORDER — HYDROCODONE BITARTRATE AND ACETAMINOPHEN 7.5; 325 MG/1; MG/1
1 TABLET ORAL EVERY 4 HOURS PRN
Qty: 42 TABLET | Refills: 0 | Status: SHIPPED | OUTPATIENT
Start: 2024-03-29

## 2024-03-29 RX ORDER — METHYLPREDNISOLONE ACETATE 80 MG/ML
80 INJECTION, SUSPENSION INTRA-ARTICULAR; INTRALESIONAL; INTRAMUSCULAR; SOFT TISSUE
Status: COMPLETED | OUTPATIENT
Start: 2024-03-29 | End: 2024-03-29

## 2024-03-29 RX ORDER — ONDANSETRON 4 MG/1
4 TABLET, FILM COATED ORAL EVERY 8 HOURS PRN
Qty: 12 TABLET | Refills: 0 | Status: SHIPPED | OUTPATIENT
Start: 2024-03-29

## 2024-03-29 RX ADMIN — LIDOCAINE HYDROCHLORIDE 2 ML: 10 INJECTION, SOLUTION EPIDURAL; INFILTRATION; INTRACAUDAL; PERINEURAL at 10:25

## 2024-03-29 RX ADMIN — METHYLPREDNISOLONE ACETATE 80 MG: 80 INJECTION, SUSPENSION INTRA-ARTICULAR; INTRALESIONAL; INTRAMUSCULAR; SOFT TISSUE at 10:25

## 2024-03-29 NOTE — PROGRESS NOTES
Berenice Gragner     : 1960     MRN: [id number]     DATE: 3/29/2024    DIAGNOSIS: Follow-up 2 weeks status post right total knee arthroplasty    SUBJECTIVE:  Patient returns today for 2 week follow up of recent right knee replacement. Patient reports doing well with no unusual complaints.  Patient reports compliance with the anticoagulation.    Patient is interested in trying a cortisone injection for her left knee pain.    OBJECTIVE:     Exam: Right knee:  The incision is healing appropriately.  No sign of infection.  Range of motion is progressing as expected.  The calf is soft and nontender with a negative Homans sign.    DIAGNOSTIC STUDIES     Xrays: AP and lateral views of the right knee were ordered and reviewed for evaluation of recent knee replacement. They demonstrate a well positioned, well aligned knee replacement without complicating factors noted. In comparison with previous films there has been interval implant placement.    ASSESSMENT:  1.  2 week status post right knee replacement  2.  Left knee arthritis    PLAN:   1) Continue PT   2) Continue to ice as needed.   3) Continue anticoagulation as discussed.   4) I have agreed to refill her Norco and Zofran today.  The risks were discussed.   5) The risks, benefits, and alternatives to a cortisone injection were discussed.  She verbalized understanding and consented to proceed.  The injection was performed as noted below.     6)  Follow-up in 4 weeks with Dr. Beltran.     Zuly Aguilera, AGNIESZKA    3/29/2024    Large Joint Arthrocentesis: L knee  Date/Time: 3/29/2024 10:25 AM  Consent given by: patient  Site marked: site marked  Timeout: Immediately prior to procedure a time out was called to verify the correct patient, procedure, equipment, support staff and site/side marked as required   Supporting Documentation  Indications: pain   Procedure Details  Location: knee - L knee  Preparation: Patient was prepped and draped in the usual sterile  fashion  Needle gauge: 21G.  Approach: anterolateral  Medications administered: 80 mg methylPREDNISolone acetate 80 MG/ML; 2 mL lidocaine PF 1% 1 %  Patient tolerance: patient tolerated the procedure well with no immediate complications         Answers submitted by the patient for this visit:  Other (Submitted on 3/27/2024)  Please describe your symptoms.: Post op  Have you had these symptoms before?: No  How long have you been having these symptoms?: 1-2 weeks  Please list any medications you are currently taking for this condition.: Hydrocodone, baby aspirin, docusate, ondansetron  Please describe any probable cause for these symptoms. : Post op  Primary Reason for Visit (Submitted on 3/27/2024)  What is the primary reason for your visit?: Other

## 2024-03-29 NOTE — PROGRESS NOTES
Physical Therapy Daily Treatment Note  Visit: 3    Berenice Granger reports: feels much better today with greater ease in moving her right knee and walking since last PT visit days ago.   YOB: 1960  Referring practitioner : Celestine Beltran MD  Date of Initial: Type: THERAPY  Noted: 3/20/2024  Today's date: 3/29/2024  Patient seen for 3 sessions    Visit Diagnoses:    ICD-10-CM ICD-9-CM   1. Acute pain of right knee  M25.561 719.46   2. Gait disturbance  R26.9 781.2   3. Orthopedic aftercare  Z47.89 V54.9       Subjective       Objective   R knee FL AROM 95 degrees end of visit  See Exercise, Manual, and Modality Logs for complete treatment.       Assessment/Plan    Improving R Knee AROM both FL/EXT, active quad contraction and gait mechanics.  Good understanding and compliance with HEP.     Plan:    Continue current           Timed:         Manual Therapy:    8     mins  28707;     Therapeutic Exercise:    22     mins  78664;     Neuromuscular Lobo:    10    mins  85247;        Timed Treatment:   40   mins   Total Treatment:     40   mins         Markell Cr PT, DPT  Physical Therapist  [place] Lic #[id number]

## 2024-04-02 ENCOUNTER — TREATMENT (OUTPATIENT)
Dept: PHYSICAL THERAPY | Facility: CLINIC | Age: 64
End: 2024-04-02
Payer: COMMERCIAL

## 2024-04-02 DIAGNOSIS — M25.561 ACUTE PAIN OF RIGHT KNEE: Primary | ICD-10-CM

## 2024-04-02 DIAGNOSIS — Z47.89 ORTHOPEDIC AFTERCARE: ICD-10-CM

## 2024-04-02 DIAGNOSIS — R26.9 GAIT DISTURBANCE: ICD-10-CM

## 2024-04-02 PROCEDURE — 97110 THERAPEUTIC EXERCISES: CPT | Performed by: PHYSICAL THERAPIST

## 2024-04-02 PROCEDURE — 97112 NEUROMUSCULAR REEDUCATION: CPT | Performed by: PHYSICAL THERAPIST

## 2024-04-02 PROCEDURE — 97140 MANUAL THERAPY 1/> REGIONS: CPT | Performed by: PHYSICAL THERAPIST

## 2024-04-02 NOTE — PROGRESS NOTES
Physical Therapy Daily Treatment Note  Visit: 4    Berenice Granger reports: did well with last visit and continued with HEP at least 3-4 x per day.   YOB: 1960  Referring practitioner : Celestine Beltran MD  Date of Initial: Type: THERAPY  Noted: 3/20/2024  Today's date: 2024  Patient seen for 4 sessions    Visit Diagnoses:    ICD-10-CM ICD-9-CM   1. Acute pain of right knee  M25.561 719.46   2. Gait disturbance  R26.9 781.2   3. Orthopedic aftercare  Z47.89 V54.9       Subjective       Objective   ST. R knee AROM improved 5-110 over 2 weeks. - Progressed towards  2. Gait mechanics improved with ability to safely transition to use of straight cane in home for gait over 2 weeks. - Progressed towards   3. R knee pain decreased 20% over greater over 2 weeks. - Met  4. Independent and compliant with HEP over 2 weeks. - Met      See Exercise, Manual, and Modality Logs for complete treatment.       Assessment/Plan    Improved R knee FL especially with inferior patellar glide along with better gait mechanics and SLR.     Plan:    Continue            Timed:         Manual Therapy:    10     mins  59539;     Therapeutic Exercise:    22     mins  47982;     Neuromuscular Lobo:    9    mins  17688;        Timed Treatment:   41   mins   Total Treatment:     41   mins         Markell Cr PT, DPT  Physical Therapist  [place] Lic #[id number]

## 2024-04-04 ENCOUNTER — TREATMENT (OUTPATIENT)
Dept: PHYSICAL THERAPY | Facility: CLINIC | Age: 64
End: 2024-04-04
Payer: COMMERCIAL

## 2024-04-04 DIAGNOSIS — R26.9 GAIT DISTURBANCE: ICD-10-CM

## 2024-04-04 DIAGNOSIS — M25.561 ACUTE PAIN OF RIGHT KNEE: Primary | ICD-10-CM

## 2024-04-04 DIAGNOSIS — Z47.89 ORTHOPEDIC AFTERCARE: ICD-10-CM

## 2024-04-04 PROCEDURE — 97140 MANUAL THERAPY 1/> REGIONS: CPT | Performed by: PHYSICAL THERAPIST

## 2024-04-04 PROCEDURE — 97110 THERAPEUTIC EXERCISES: CPT | Performed by: PHYSICAL THERAPIST

## 2024-04-04 NOTE — PROGRESS NOTES
Physical Therapy Daily Treatment Note  Visit: 5    Berenice Granger reports: pleased with progress as she is gradually feeling better.  Reports she has bee able to bend her R knee more easily with HEP progression.   YOB: 1960  Referring practitioner : Celestine Beltran MD  Date of Initial: Type: THERAPY  Noted: 3/20/2024  Today's date: 2024  Patient seen for 5 sessions    Visit Diagnoses:    ICD-10-CM ICD-9-CM   1. Acute pain of right knee  M25.561 719.46   2. Gait disturbance  R26.9 781.2   3. Orthopedic aftercare  Z47.89 V54.9       Subjective       Objective R knee FL AROM 100 degrees end of visit  See Exercise, Manual, and Modality Logs for complete treatment.       Assessment/Plan    Good gait mechanics with use of straight cane and more limited per L knee pain than R.  Improving but limited quad contraction R.     Plan:    Continue current           Timed:         Manual Therapy:    9     mins  19480;     Therapeutic Exercise:    15     mins  52383;     Gait Trainin     mins  78838;               Timed Treatment:   29   mins   Total Treatment:     29   mins         Markell Cr PT, DPT  Physical Therapist  [place] Lic #[id number]

## 2024-04-09 ENCOUNTER — TREATMENT (OUTPATIENT)
Dept: PHYSICAL THERAPY | Facility: CLINIC | Age: 64
End: 2024-04-09
Payer: COMMERCIAL

## 2024-04-09 DIAGNOSIS — Z47.89 ORTHOPEDIC AFTERCARE: ICD-10-CM

## 2024-04-09 DIAGNOSIS — M25.561 ACUTE PAIN OF RIGHT KNEE: Primary | ICD-10-CM

## 2024-04-09 DIAGNOSIS — R26.9 GAIT DISTURBANCE: ICD-10-CM

## 2024-04-09 PROCEDURE — 97140 MANUAL THERAPY 1/> REGIONS: CPT | Performed by: PHYSICAL THERAPIST

## 2024-04-09 PROCEDURE — 97112 NEUROMUSCULAR REEDUCATION: CPT | Performed by: PHYSICAL THERAPIST

## 2024-04-09 PROCEDURE — 97110 THERAPEUTIC EXERCISES: CPT | Performed by: PHYSICAL THERAPIST

## 2024-04-09 NOTE — PROGRESS NOTES
Physical Therapy Daily Treatment Note  Visit: 6    Berenice Granger reports: feeling much better overall.  Reports walking mainly with cane at this time.   YOB: 1960  Referring practitioner : Celestine Beltran MD  Date of Initial: Type: THERAPY  Noted: 3/20/2024  Today's date: 4/9/2024  Patient seen for 6 sessions    Visit Diagnoses:    ICD-10-CM ICD-9-CM   1. Acute pain of right knee  M25.561 719.46   2. Orthopedic aftercare  Z47.89 V54.9   3. Gait disturbance  R26.9 781.2       Subjective       Objective   L knee AROM 5-100 end of session  See Exercise, Manual, and Modality Logs for complete treatment.       Assessment/Plan    Improving but limited R knee ROM, active quad contraction and gait mechanics.  Ambulates safely with straight cane with good mechanics.  Still encouraged for frequent HEP for ROM especially knee FL.    Plan:    Continue current               Timed:         Manual Therapy:    9     mins  22898;     Therapeutic Exercise:    21     mins  18033;     Neuromuscular Lobo:    10    mins  36783;        Timed Treatment:   40   mins   Total Treatment:     40   mins         Markell Cr PT, DPT  Physical Therapist  [place] Lic #[id number]

## 2024-04-11 ENCOUNTER — TREATMENT (OUTPATIENT)
Dept: PHYSICAL THERAPY | Facility: CLINIC | Age: 64
End: 2024-04-11
Payer: COMMERCIAL

## 2024-04-11 DIAGNOSIS — M25.561 ACUTE PAIN OF RIGHT KNEE: Primary | ICD-10-CM

## 2024-04-11 DIAGNOSIS — R26.9 GAIT DISTURBANCE: ICD-10-CM

## 2024-04-11 DIAGNOSIS — Z47.89 ORTHOPEDIC AFTERCARE: ICD-10-CM

## 2024-04-11 PROCEDURE — 97110 THERAPEUTIC EXERCISES: CPT | Performed by: PHYSICAL THERAPIST

## 2024-04-11 PROCEDURE — 97112 NEUROMUSCULAR REEDUCATION: CPT | Performed by: PHYSICAL THERAPIST

## 2024-04-11 PROCEDURE — 97140 MANUAL THERAPY 1/> REGIONS: CPT | Performed by: PHYSICAL THERAPIST

## 2024-04-11 NOTE — PROGRESS NOTES
Physical Therapy Daily Treatment Note  Visit: 7    Berenice Granger reports: no new issues today.   YOB: 1960  Referring practitioner : Celestine Beltran MD  Date of Initial: Type: THERAPY  Noted: 3/20/2024  Today's date: 4/11/2024  Patient seen for 7 sessions    Visit Diagnoses:    ICD-10-CM ICD-9-CM   1. Acute pain of right knee  M25.561 719.46   2. Orthopedic aftercare  Z47.89 V54.9   3. Gait disturbance  R26.9 781.2       Subjective       Objective    R knee  degrees end of visit.   See Exercise, Manual, and Modality Logs for complete treatment.       Assessment/Plan    Limited but improving R knee FL.  Progressions of WB and gait to straight cane well tolerated with L knee pain limiting B activity as much as R at this time.     Plan:    Continue            Timed:         Manual Therapy:    10     mins  45298;     Therapeutic Exercise:    19     mins  65305;     Neuromuscular Lobo:    10    mins  98341;        Timed Treatment:   39   mins   Total Treatment:     39   mins         Markell Cr PT, DPT  Physical Therapist  [place] Lic #[id number]

## 2024-04-12 DIAGNOSIS — Z96.651 S/P TKR (TOTAL KNEE REPLACEMENT), RIGHT: Primary | ICD-10-CM

## 2024-04-12 DIAGNOSIS — Z96.651 S/P TKR (TOTAL KNEE REPLACEMENT), RIGHT: ICD-10-CM

## 2024-04-12 RX ORDER — ASPIRIN 81 MG/1
81 TABLET ORAL 2 TIMES DAILY
Qty: 60 TABLET | Refills: 0 | Status: SHIPPED | OUTPATIENT
Start: 2024-04-12

## 2024-04-12 RX ORDER — DOCUSATE SODIUM 100 MG/1
100 CAPSULE, LIQUID FILLED ORAL 2 TIMES DAILY
Qty: 60 CAPSULE | Refills: 0 | Status: SHIPPED | OUTPATIENT
Start: 2024-04-12

## 2024-04-12 RX ORDER — HYDROCODONE BITARTRATE AND ACETAMINOPHEN 7.5; 325 MG/1; MG/1
1 TABLET ORAL EVERY 4 HOURS PRN
Qty: 42 TABLET | Refills: 0 | Status: SHIPPED | OUTPATIENT
Start: 2024-04-12

## 2024-04-12 RX ORDER — ACETAMINOPHEN 325 MG/1
650 TABLET ORAL 2 TIMES DAILY PRN
Qty: 60 TABLET | Refills: 0 | Status: SHIPPED | OUTPATIENT
Start: 2024-04-12

## 2024-04-12 NOTE — TELEPHONE ENCOUNTER
Last refill 03/29/24    Surgery 03/19/24    Last appointment 03/29/24    Future appointment 04/26/24

## 2024-04-16 ENCOUNTER — TREATMENT (OUTPATIENT)
Dept: PHYSICAL THERAPY | Facility: CLINIC | Age: 64
End: 2024-04-16
Payer: COMMERCIAL

## 2024-04-16 ENCOUNTER — TELEPHONE (OUTPATIENT)
Dept: ORTHOPEDIC SURGERY | Facility: HOSPITAL | Age: 64
End: 2024-04-16
Payer: COMMERCIAL

## 2024-04-16 DIAGNOSIS — R26.9 GAIT DISTURBANCE: ICD-10-CM

## 2024-04-16 DIAGNOSIS — M25.561 ACUTE PAIN OF RIGHT KNEE: Primary | ICD-10-CM

## 2024-04-16 DIAGNOSIS — Z47.89 ORTHOPEDIC AFTERCARE: ICD-10-CM

## 2024-04-16 PROCEDURE — 97110 THERAPEUTIC EXERCISES: CPT | Performed by: PHYSICAL THERAPIST

## 2024-04-16 PROCEDURE — 97112 NEUROMUSCULAR REEDUCATION: CPT | Performed by: PHYSICAL THERAPIST

## 2024-04-16 PROCEDURE — 97140 MANUAL THERAPY 1/> REGIONS: CPT | Performed by: PHYSICAL THERAPIST

## 2024-04-16 NOTE — PROGRESS NOTES
Physical Therapy Daily Treatment Note  Visit: 8    Berenice Granger reports: has been walking more without use of cane and feeling better overall.   YOB: 1960  Referring practitioner : Celestine Beltran MD  Date of Initial: Type: THERAPY  Noted: 3/20/2024  Today's date: 4/16/2024  Patient seen for 8 sessions    Visit Diagnoses:    ICD-10-CM ICD-9-CM   1. Acute pain of right knee  M25.561 719.46   2. Gait disturbance  R26.9 781.2   3. Orthopedic aftercare  Z47.89 V54.9       Subjective       Objective   R knee AROM 5-105 degrees  See Exercise, Manual, and Modality Logs for complete treatment.       Assessment/Plan    Improving but limited R knee ROM both FL/EXT.  Decreased pain with knee FL at endrange.  Active quad contraction still limited R with substitution with hip EXT.  Instructed in seated knee EXT/band TKE and use of NMES to right quad helpful for active quad contraction.     Plan:    Continue            Timed:         Manual Therapy:    10     mins  04831;     Therapeutic Exercise:    15     mins  61918;     Neuromuscular Lobo:    14    mins  13443;          Timed Treatment:   39   mins   Total Treatment:     39   mins         Markell Cr PT, DPT  Physical Therapist  [place] Lic #[id number]

## 2024-04-16 NOTE — TELEPHONE ENCOUNTER
Attempted to reach Ms. Granger to see how she has been doing since her L uni knee 3/19. Message left at this time.

## 2024-04-18 ENCOUNTER — TREATMENT (OUTPATIENT)
Dept: PHYSICAL THERAPY | Facility: CLINIC | Age: 64
End: 2024-04-18
Payer: COMMERCIAL

## 2024-04-18 DIAGNOSIS — M25.561 ACUTE PAIN OF RIGHT KNEE: Primary | ICD-10-CM

## 2024-04-18 DIAGNOSIS — Z47.89 ORTHOPEDIC AFTERCARE: ICD-10-CM

## 2024-04-18 DIAGNOSIS — R26.9 GAIT DISTURBANCE: ICD-10-CM

## 2024-04-18 PROCEDURE — 97112 NEUROMUSCULAR REEDUCATION: CPT | Performed by: PHYSICAL THERAPIST

## 2024-04-18 PROCEDURE — 97110 THERAPEUTIC EXERCISES: CPT | Performed by: PHYSICAL THERAPIST

## 2024-04-18 PROCEDURE — 97140 MANUAL THERAPY 1/> REGIONS: CPT | Performed by: PHYSICAL THERAPIST

## 2024-04-18 NOTE — PROGRESS NOTES
Physical Therapy Daily Treatment Note  Visit: 9    Berenice Granger reports: no new issues today.  YOB: 1960  Referring practitioner : Celestine Beltran MD  Date of Initial: Type: THERAPY  Noted: 3/20/2024  Today's date: 4/18/2024  Patient seen for 9 sessions    Visit Diagnoses:    ICD-10-CM ICD-9-CM   1. Acute pain of right knee  M25.561 719.46   2. Gait disturbance  R26.9 781.2   3. Orthopedic aftercare  Z47.89 V54.9       Subjective       Objective   See Exercise, Manual, and Modality Logs for complete treatment.       Assessment/Plan    Large improvement R knee FL and gait quality with increased focus on inferior patellar mobilization.  R quad still limited ability for good active contraction but increased over last several visits.     Plan:    Continue              Timed:         Manual Therapy:    9     mins  59365;     Therapeutic Exercise:    20     mins  23781;     Neuromuscular Lobo:    11    mins  97570;          Timed Treatment:   40   mins   Total Treatment:     40   mins         Markell Cr PT, DPT  Physical Therapist  [place] Lic #[id number]

## 2024-04-23 ENCOUNTER — TREATMENT (OUTPATIENT)
Dept: PHYSICAL THERAPY | Facility: CLINIC | Age: 64
End: 2024-04-23
Payer: COMMERCIAL

## 2024-04-23 DIAGNOSIS — R26.9 GAIT DISTURBANCE: ICD-10-CM

## 2024-04-23 DIAGNOSIS — Z47.89 ORTHOPEDIC AFTERCARE: ICD-10-CM

## 2024-04-23 DIAGNOSIS — M25.561 ACUTE PAIN OF RIGHT KNEE: Primary | ICD-10-CM

## 2024-04-23 PROCEDURE — 97140 MANUAL THERAPY 1/> REGIONS: CPT | Performed by: PHYSICAL THERAPIST

## 2024-04-23 PROCEDURE — 97530 THERAPEUTIC ACTIVITIES: CPT | Performed by: PHYSICAL THERAPIST

## 2024-04-23 PROCEDURE — 97110 THERAPEUTIC EXERCISES: CPT | Performed by: PHYSICAL THERAPIST

## 2024-04-23 NOTE — PROGRESS NOTES
Re-Assessment / Progress Note    Patient: Berenice Granger   : 1960  Diagnosis/ICD-10 Code:  Acute pain of right knee [M25.561]  Referring practitioner: Celestine Beltran MD  Date of Initial Visit: Episode Type: THERAPY  Noted: 3/20/2024    Today's Date: 2024  Patient seen for 10 sessions.      Subjective:   Berenice Granger reports: she is feeling better overall and has been able to discontinue use of her cane in home but still uses in public.  Reports feels better when she is moving her right knee with more discomfort and stiffness if she is not regularly up and moving.   Subjective Questionnaire:  Knee Outcome Survey 31%  Clinical Progress: improved  Home Program Compliance: Yes  Treatment has included: therapeutic exercise, neuromuscular re-education, manual therapy, therapeutic activity, gait training, moist heat, and cryotherapy    Subjective Evaluation    History of Present Illness      Patient Occupation: Retired Pain  Current pain ratin  At best pain rating: 3  At worst pain ratin  Pain location: Right knee.  Quality: dull ache  Relieving factors: ice, medications and rest (Exercise)  Aggravating factors: ambulation and prolonged positioning  Progression: improved       Objective          Active Range of Motion     Right Knee   Flexion: 100 degrees   Extensor lag: 10 degrees     Patellar Mobility     Right Knee Patellar tendons within functional limits include the medial and lateral. Hypomobile in the superior and inferior patellar tendon(s).     Strength/Myotome Testing     Right Knee   Flexion: 4  Extension: 3+  Quadriceps contraction: fair    Ambulation     Comments   Improved gait mechanics - still limits per lack of full EXT with heel strike, stance time especially midstance and push off R LE    Functional Assessment     Comments  TUG 11.5 seconds  FTSST - 16.3 seconds w/UE use      Assessment & Plan       Assessment  Impairments: abnormal gait, abnormal or restricted ROM, impaired  balance, impaired physical strength and pain with function   Functional limitations: carrying objects, walking and standing (Stairs, kneeling, squatting past certain depth)  Assessment details: Presents with continued but improved limits in R knee ROM, strength, gait mechanics, ability for reciprocal stair ascent/descent, patellar mobility, ability for good quadriceps contraction R LE and activity limits per the above. She has met or progressed towards all STG/LTG and would benefit from continued skilled PT to meet all goals and reach prior level of function.   Prognosis: good      Progress toward previous goals:  all met or progressed towards    Goals    Short-term goals (STG):   1. R knee AROM improved 5-110 over 2 weeks.  - Progressed towards   2. Gait mechanics improved with ability to safely transition to use of straight cane in home for gait over 2 weeks. - Met  3. R knee pain decreased 20% over greater over 2 weeks. - Met  4. Independent and compliant with HEP over 2 weeks. - Met    Long-term goals (LTG):   1. Knee Outcome Survey increased to 75% or greater over 12 weeks. - Progressed towards   2. R knee strength 5/5 both EXT/FL over 12 weeks. - Progressed towards  3. Five Time Sit to Stand and TUG both improved to age adjusted norms or less over 12 weeks. - Progressed towards   4. Single leg balance R/L over 12 weeks to 5 seconds w/o UE support. - Progressed towards  5. To verbalize readiness for discharge per improved function/pain/HEP independence over 12 weeks. - Progressed towards          Recommendations: Continue with recommendations to continue PT to progress to meeting all goals and prior function  Timeframe: 6 weeks  Prognosis to achieve goals: good    PT Signature: Markell Cr, PT, DPT          Based upon review of the patient's progress and continued therapy plan, it is my medical opinion that Berenice Granger should continue physical therapy treatment at [facility]  PHYSICAL THERAPY  [address] [place] [address] [address] [place] [address] [phone].        Timed:         Manual Therapy:    10     mins  86073;     Therapeutic Exercise:    21     mins  35685;     Therapeutic Activity:     8     mins  35831;             Timed Treatment:   39   mins   Total Treatment:     39   mins

## 2024-04-25 ENCOUNTER — TREATMENT (OUTPATIENT)
Dept: PHYSICAL THERAPY | Facility: CLINIC | Age: 64
End: 2024-04-25
Payer: COMMERCIAL

## 2024-04-25 DIAGNOSIS — R26.9 GAIT DISTURBANCE: ICD-10-CM

## 2024-04-25 DIAGNOSIS — M25.561 ACUTE PAIN OF RIGHT KNEE: Primary | ICD-10-CM

## 2024-04-25 DIAGNOSIS — Z47.89 ORTHOPEDIC AFTERCARE: ICD-10-CM

## 2024-04-25 PROCEDURE — 97112 NEUROMUSCULAR REEDUCATION: CPT | Performed by: PHYSICAL THERAPIST

## 2024-04-25 PROCEDURE — 97140 MANUAL THERAPY 1/> REGIONS: CPT | Performed by: PHYSICAL THERAPIST

## 2024-04-25 PROCEDURE — 97110 THERAPEUTIC EXERCISES: CPT | Performed by: PHYSICAL THERAPIST

## 2024-04-25 NOTE — PROGRESS NOTES
Physical Therapy Daily Treatment Note  Visit: 11    Berenice Granger reports: no new issues today.  Reports is feeling gradual improvement in her R knee ROM/strength and ability to walk and be on her feet.   YOB: 1960  Referring practitioner : Celestine Beltran MD  Date of Initial: Type: THERAPY  Noted: 3/20/2024  Today's date: 4/25/2024  Patient seen for 11 sessions    Visit Diagnoses:    ICD-10-CM ICD-9-CM   1. Acute pain of right knee  M25.561 719.46   2. Gait disturbance  R26.9 781.2   3. Orthopedic aftercare  Z47.89 V54.9       Subjective       Objective   R knee AROM beginning - 8-105 - end of session 3-115 degrees  See Exercise, Manual, and Modality Logs for complete treatment.       Assessment/Plan    ROM progressing well with near full R knee EXT end of session along with better active R quad contraction.  Knee FL ROM improving each visit with pain lessening.     Plan:    Continue            Timed:         Manual Therapy:    9     mins  85910;     Therapeutic Exercise:    21     mins  22465;     Neuromuscular Lobo:    10    mins  83471;          Timed Treatment:   40   mins   Total Treatment:     40   mins         Markell Cr PT, DPT  Physical Therapist  [place] Lic #[id number]

## 2024-04-26 ENCOUNTER — OFFICE VISIT (OUTPATIENT)
Dept: ORTHOPEDIC SURGERY | Facility: CLINIC | Age: 64
End: 2024-04-26
Payer: COMMERCIAL

## 2024-04-26 VITALS — WEIGHT: 138.4 LBS | TEMPERATURE: 98.7 F | HEIGHT: 66 IN | BODY MASS INDEX: 22.24 KG/M2

## 2024-04-26 DIAGNOSIS — R52 PAIN: ICD-10-CM

## 2024-04-26 DIAGNOSIS — Z09 SURGERY FOLLOW-UP: Primary | ICD-10-CM

## 2024-04-26 NOTE — PROGRESS NOTES
Berenice Garnger : 1960 MRN: [id number] DATE: 2024    DIAGNOSIS: 6 week follow up right UKA      SUBJECTIVE:  Patient returns today for 6 week follow up of the right uni-compartment knee replacement. Patient reports doing well with no unusual complaints.     Vitals:    24 0957   Temp: 98.7 °F (37.1 °C)       OBJECTIVE:     Exam:  The incision is well healed. No sign of infection. Range of motion: 5 to 110. The calf is soft and nontender with a negative Homans sign.  Gait is reciprocal heel-to-toe and only mildly antalgic.    DIAGNOSTIC STUDIES    Xrays: 3 views of the right knee (AP, lateral, and sunrise) were ordered and reviewed for evaluation of recent uni-compartment knee replacement. They demonstrate a well positioned, well aligned knee replacement without complicating factors noted. In comparison with previous films there has been no change.    ASSESSMENT:  6 week status post right uni-compartment knee replacement.    PLAN:   1) Continue with PT exercises as prescribed     2) Follow up in 6 weeks    Celestine Beltran MD  2024

## 2024-04-30 DIAGNOSIS — Z96.651 S/P TKR (TOTAL KNEE REPLACEMENT), RIGHT: ICD-10-CM

## 2024-04-30 RX ORDER — HYDROCODONE BITARTRATE AND ACETAMINOPHEN 7.5; 325 MG/1; MG/1
1 TABLET ORAL EVERY 4 HOURS PRN
Qty: 42 TABLET | Refills: 0 | Status: SHIPPED | OUTPATIENT
Start: 2024-04-30 | End: 2024-05-02 | Stop reason: SDUPTHER

## 2024-05-02 DIAGNOSIS — Z96.651 S/P TKR (TOTAL KNEE REPLACEMENT), RIGHT: ICD-10-CM

## 2024-05-02 RX ORDER — HYDROCODONE BITARTRATE AND ACETAMINOPHEN 7.5; 325 MG/1; MG/1
1 TABLET ORAL EVERY 6 HOURS PRN
Qty: 42 TABLET | Refills: 0 | Status: SHIPPED | OUTPATIENT
Start: 2024-05-02

## 2024-05-02 NOTE — TELEPHONE ENCOUNTER
Pt requested refill via [employer] message.   RT unicompartment KA:3/19/24  Last fill: 4/30/24  Last OV: 4/26/24  Next OV: 6/7/24  Pharmacy confirmed: [facility]

## 2024-05-02 NOTE — TELEPHONE ENCOUNTER
----- Message from Linette SOLER sent at 5/2/2024  9:43 AM EDT -----  Regarding: Refill on pain meds  Contact: [phone]  Could you send the refill to [facility] at [phone].. thanks

## 2024-05-03 ENCOUNTER — TREATMENT (OUTPATIENT)
Dept: PHYSICAL THERAPY | Facility: CLINIC | Age: 64
End: 2024-05-03
Payer: COMMERCIAL

## 2024-05-03 DIAGNOSIS — Z47.89 ORTHOPEDIC AFTERCARE: ICD-10-CM

## 2024-05-03 DIAGNOSIS — R26.9 GAIT DISTURBANCE: ICD-10-CM

## 2024-05-03 DIAGNOSIS — M25.561 ACUTE PAIN OF RIGHT KNEE: Primary | ICD-10-CM

## 2024-05-03 NOTE — PROGRESS NOTES
Physical Therapy Daily Treatment Note  Visit: 12    Berenice Granger reports: returned to MD and pleased with progress.  Reports has continued HEP while awaiting insurance approval for more PT visits.   YOB: 1960  Referring practitioner : Celestine Beltran MD  Date of Initial: Type: THERAPY  Noted: 3/20/2024  Today's date: 5/3/2024  Patient seen for 12 sessions    Visit Diagnoses:    ICD-10-CM ICD-9-CM   1. Acute pain of right knee  M25.561 719.46   2. Gait disturbance  R26.9 781.2   3. Orthopedic aftercare  Z47.89 V54.9       Subjective       Objective   R knee AROM  beginning of visit - 4-114 end of visit  See Exercise, Manual, and Modality Logs for complete treatment.       Assessment/Plan    R knee FL more limited initially along continued but improved difficulty with R active quad contraction. Better ability for R quad contraction with seated knee EXT than quad set or SLR.  Left knee pain limits WB with Total gym squats but improved with decreased WB per more decline of machine.     Plan:    Continue           Timed:         Manual Therapy:    9     mins  62523;     Therapeutic Exercise:    25     mins  91662;     Neuromuscular Lobo:    11    mins  55291;    Therapeutic Activity:     10     mins  40161;           Timed Treatment:   55   mins   Total Treatment:     55   mins         Markell Cr, PT  Physical Therapist  [place] Lic #[id number]

## 2024-05-06 ENCOUNTER — TREATMENT (OUTPATIENT)
Dept: PHYSICAL THERAPY | Facility: CLINIC | Age: 64
End: 2024-05-06
Payer: COMMERCIAL

## 2024-05-06 DIAGNOSIS — Z47.89 ORTHOPEDIC AFTERCARE: ICD-10-CM

## 2024-05-06 DIAGNOSIS — R26.9 GAIT DISTURBANCE: ICD-10-CM

## 2024-05-06 DIAGNOSIS — M25.561 ACUTE PAIN OF RIGHT KNEE: Primary | ICD-10-CM

## 2024-05-06 PROCEDURE — 97110 THERAPEUTIC EXERCISES: CPT | Performed by: PHYSICAL THERAPIST

## 2024-05-06 PROCEDURE — 97112 NEUROMUSCULAR REEDUCATION: CPT | Performed by: PHYSICAL THERAPIST

## 2024-05-06 PROCEDURE — 97140 MANUAL THERAPY 1/> REGIONS: CPT | Performed by: PHYSICAL THERAPIST

## 2024-05-14 ENCOUNTER — TREATMENT (OUTPATIENT)
Dept: PHYSICAL THERAPY | Facility: CLINIC | Age: 64
End: 2024-05-14
Payer: COMMERCIAL

## 2024-05-14 DIAGNOSIS — M25.561 ACUTE PAIN OF RIGHT KNEE: Primary | ICD-10-CM

## 2024-05-14 DIAGNOSIS — R26.9 GAIT DISTURBANCE: ICD-10-CM

## 2024-05-14 DIAGNOSIS — Z47.89 ORTHOPEDIC AFTERCARE: ICD-10-CM

## 2024-05-14 PROCEDURE — 97112 NEUROMUSCULAR REEDUCATION: CPT | Performed by: PHYSICAL THERAPIST

## 2024-05-14 PROCEDURE — 97110 THERAPEUTIC EXERCISES: CPT | Performed by: PHYSICAL THERAPIST

## 2024-05-14 NOTE — PROGRESS NOTES
Physical Therapy Daily Progress Note      Patient: Berenice Granger   : 1960  Diagnosis/ICD-10 Code:  Acute pain of right knee [M25.561]  Referring practitioner: Celestine Beltran MD  Date of Initial Visit: Type: THERAPY  Noted: 3/20/2024  Today's Date: 2024  Patient seen for 14 sessions             Subjective Pt went camping last week and her knee was a little sore as result.    Objective   See Exercise, Manual, and Modality Logs for complete treatment.       Assessment/Plan  Responded well to manual techniques and exercises    Progress per Plan of Care           Timed:         Manual Therapy:    5     mins  65073;     Therapeutic Exercise:    20     mins  81014;     Neuromuscular Lobo:    10    mins  52255;        Timed Treatment:   35   mins   Total Treatment:     35   mins        Jorge Li PTA  Physical Therapist Assistant

## 2024-05-16 ENCOUNTER — TREATMENT (OUTPATIENT)
Dept: PHYSICAL THERAPY | Facility: CLINIC | Age: 64
End: 2024-05-16
Payer: COMMERCIAL

## 2024-05-16 DIAGNOSIS — M25.561 ACUTE PAIN OF RIGHT KNEE: Primary | ICD-10-CM

## 2024-05-16 DIAGNOSIS — R26.9 GAIT DISTURBANCE: ICD-10-CM

## 2024-05-16 DIAGNOSIS — Z47.89 ORTHOPEDIC AFTERCARE: ICD-10-CM

## 2024-05-16 PROCEDURE — 97140 MANUAL THERAPY 1/> REGIONS: CPT | Performed by: PHYSICAL THERAPIST

## 2024-05-16 PROCEDURE — 97112 NEUROMUSCULAR REEDUCATION: CPT | Performed by: PHYSICAL THERAPIST

## 2024-05-16 PROCEDURE — 97110 THERAPEUTIC EXERCISES: CPT | Performed by: PHYSICAL THERAPIST

## 2024-05-16 NOTE — PROGRESS NOTES
Physical Therapy Daily Treatment Note  Visit: 15    Berenice Granger reports: no new issues today.  Reports has been able to mostly wean from both pain medication and OTC medications for pain.   YOB: 1960  Referring practitioner : Celestine Beltran MD  Date of Initial: Type: THERAPY  Noted: 3/20/2024  Today's date: 5/16/2024  Patient seen for 15 sessions    Visit Diagnoses:    ICD-10-CM ICD-9-CM   1. Acute pain of right knee  M25.561 719.46   2. Gait disturbance  R26.9 781.2   3. Orthopedic aftercare  Z47.89 V54.9       Subjective       Objective   See Exercise, Manual, and Modality Logs for complete treatment.       Assessment/Plan      Still some R quad inhibition and limits in ends of both FL/EXT R knee ROM.  Good HEP adherence and understanding.     Plan:    Continue             Timed:         Manual Therapy:    10     mins  15812;     Therapeutic Exercise:    15     mins  57632;     Neuromuscular Lobo:    15    mins  67547;          Timed Treatment:   40   mins   Total Treatment:     40   mins         Markell Cr PT, DPT  Physical Therapist  [place] Lic #[id number]   Detail Level: Detailed Quality 226: Preventive Care And Screening: Tobacco Use: Screening And Cessation Intervention: Patient screened for tobacco use and is an ex/non-smoker Quality 130: Documentation Of Current Medications In The Medical Record: Current Medications Documented

## 2024-05-31 ENCOUNTER — TREATMENT (OUTPATIENT)
Dept: PHYSICAL THERAPY | Facility: CLINIC | Age: 64
End: 2024-05-31
Payer: COMMERCIAL

## 2024-05-31 DIAGNOSIS — Z47.89 ORTHOPEDIC AFTERCARE: ICD-10-CM

## 2024-05-31 DIAGNOSIS — M25.561 ACUTE PAIN OF RIGHT KNEE: Primary | ICD-10-CM

## 2024-05-31 DIAGNOSIS — R26.9 GAIT DISTURBANCE: ICD-10-CM

## 2024-05-31 PROCEDURE — 97530 THERAPEUTIC ACTIVITIES: CPT | Performed by: PHYSICAL THERAPIST

## 2024-05-31 PROCEDURE — 97112 NEUROMUSCULAR REEDUCATION: CPT | Performed by: PHYSICAL THERAPIST

## 2024-05-31 PROCEDURE — 97110 THERAPEUTIC EXERCISES: CPT | Performed by: PHYSICAL THERAPIST

## 2024-05-31 NOTE — PROGRESS NOTES
Physical Therapy Daily Treatment Note  Visit: 16    Berenice Granger reports: did well on trip out of town recently but did take [employer]'s some versus walking as much as normal.   YOB: 1960  Referring practitioner : Celestine Beltran MD  Date of Initial: Type: THERAPY  Noted: 3/20/2024  Today's date: 5/31/2024  Patient seen for 16 sessions    Visit Diagnoses:    ICD-10-CM ICD-9-CM   1. Acute pain of right knee  M25.561 719.46   2. Gait disturbance  R26.9 781.2   3. Orthopedic aftercare  Z47.89 V54.9       Subjective       Objective   See Exercise, Manual, and Modality Logs for complete treatment.       Assessment/Plan    Improving R knee ROM/strength and activity tolerance.  More limits in standing per L knee pain than R with plan for L knee replacement this fall.     Plan:    Continue - progress until LTG met           Timed:           Therapeutic Exercise:    20     mins  43842;     Neuromuscular Lobo:    10    mins  41605;    Therapeutic Activity:     10     mins  48681;           Timed Treatment:   40   mins   Total Treatment:     40   mins         Markell Cr PT, DPT  Physical Therapist  [place] Lic #[id number]

## 2024-06-03 ENCOUNTER — TREATMENT (OUTPATIENT)
Dept: PHYSICAL THERAPY | Facility: CLINIC | Age: 64
End: 2024-06-03
Payer: COMMERCIAL

## 2024-06-03 DIAGNOSIS — R26.9 GAIT DISTURBANCE: ICD-10-CM

## 2024-06-03 DIAGNOSIS — Z47.89 ORTHOPEDIC AFTERCARE: ICD-10-CM

## 2024-06-03 DIAGNOSIS — M25.561 ACUTE PAIN OF RIGHT KNEE: Primary | ICD-10-CM

## 2024-06-03 PROCEDURE — 97112 NEUROMUSCULAR REEDUCATION: CPT | Performed by: PHYSICAL THERAPIST

## 2024-06-03 PROCEDURE — 97140 MANUAL THERAPY 1/> REGIONS: CPT | Performed by: PHYSICAL THERAPIST

## 2024-06-03 PROCEDURE — 97110 THERAPEUTIC EXERCISES: CPT | Performed by: PHYSICAL THERAPIST

## 2024-06-03 NOTE — PROGRESS NOTES
Physical Therapy Daily Treatment Note  Visit: 17    Berenice Granger reports: can tell improving strength and ROM in her right knee.   YOB: 1960  Referring practitioner : Celestine Beltran MD  Date of Initial: Type: THERAPY  Noted: 3/20/2024  Today's date: 6/3/2024  Patient seen for 17 sessions    Visit Diagnoses:    ICD-10-CM ICD-9-CM   1. Acute pain of right knee  M25.561 719.46   2. Gait disturbance  R26.9 781.2   3. Orthopedic aftercare  Z47.89 V54.9       Subjective       Objective   See Exercise, Manual, and Modality Logs for complete treatment.       Assessment/Plan    Progressing well - near full R knee FL and improving knee EXT and active quad contraction. More limited unilateral activity with L LE than R per pain with plan for L TKR this fall.     Plan:    Continue            Timed:         Manual Therapy:    9     mins  55694;     Therapeutic Exercise:    20     mins  44440;     Neuromuscular Lobo:    10    mins  57386;          Timed Treatment:   39   mins   Total Treatment:     39   mins         Markell Cr PT, DPT  Physical Therapist  [place] Lic #[id number]

## 2024-06-05 ENCOUNTER — TREATMENT (OUTPATIENT)
Dept: PHYSICAL THERAPY | Facility: CLINIC | Age: 64
End: 2024-06-05
Payer: COMMERCIAL

## 2024-06-05 DIAGNOSIS — M25.561 ACUTE PAIN OF RIGHT KNEE: Primary | ICD-10-CM

## 2024-06-05 DIAGNOSIS — R26.9 GAIT DISTURBANCE: ICD-10-CM

## 2024-06-05 DIAGNOSIS — Z47.89 ORTHOPEDIC AFTERCARE: ICD-10-CM

## 2024-06-05 NOTE — PROGRESS NOTES
Physical Therapy Daily Treatment Note  Visit: 18    Berenice Granger reports: some muscular and slight B knee soreness last visit.   YOB: 1960  Referring practitioner : Celestine Beltran MD  Date of Initial: Type: THERAPY  Noted: 3/20/2024  Today's date: 6/5/2024  Patient seen for 18 sessions    Visit Diagnoses:    ICD-10-CM ICD-9-CM   1. Acute pain of right knee  M25.561 719.46   2. Gait disturbance  R26.9 781.2   3. Orthopedic aftercare  Z47.89 V54.9       Subjective       Objective   See Exercise, Manual, and Modality Logs for complete treatment.       Assessment/Plan    Continued improvement in R knee ROM/strength/function.  More limited with squatting/stairs per L knee pain than R.   More limited with single leg balance activities on L LE as well.     Plan:    Continue current           Timed:             Therapeutic Exercise:    28     mins  37508;     Neuromuscular Lobo:    15    mins  14080;    Therapeutic Activity:     10     mins  79893;         Timed Treatment:   53   mins   Total Treatment:     53   mins         Markell Cr PT, DPT  Physical Therapist  [place] Lic #[id number]

## 2024-06-07 ENCOUNTER — OFFICE VISIT (OUTPATIENT)
Dept: ORTHOPEDIC SURGERY | Facility: CLINIC | Age: 64
End: 2024-06-07
Payer: COMMERCIAL

## 2024-06-07 VITALS — HEIGHT: 66 IN | WEIGHT: 138.6 LBS | BODY MASS INDEX: 22.28 KG/M2 | TEMPERATURE: 97.8 F

## 2024-06-07 DIAGNOSIS — M17.10 ARTHRITIS OF KNEE: ICD-10-CM

## 2024-06-07 DIAGNOSIS — R52 PAIN: Primary | ICD-10-CM

## 2024-06-07 RX ORDER — MELOXICAM 7.5 MG/1
15 TABLET ORAL ONCE
OUTPATIENT
Start: 2024-06-07 | End: 2024-06-07

## 2024-06-07 RX ORDER — ACETAMINOPHEN 325 MG/1
1000 TABLET ORAL ONCE
OUTPATIENT
Start: 2024-06-07 | End: 2024-06-07

## 2024-06-07 RX ORDER — PREGABALIN 150 MG/1
150 CAPSULE ORAL ONCE
OUTPATIENT
Start: 2024-06-07 | End: 2024-06-07

## 2024-06-07 RX ORDER — CHLORHEXIDINE GLUCONATE 500 MG/1
CLOTH TOPICAL 2 TIMES DAILY
OUTPATIENT
Start: 2024-06-07

## 2024-06-07 RX ORDER — ESTRADIOL 0.1 MG/G
CREAM VAGINAL
COMMUNITY
Start: 2024-04-29

## 2024-06-07 NOTE — PROGRESS NOTES
Chief Complaint: Worsening left knee pain    HPI: Ms. Granger is seen today for her left knee.  This is an issue for which I have seen her many times in the past.  She has tried activity modifications, anti-inflammatories, extensive formal physical therapy and multiple injections including both cortisone and PRP.  She continues to have medial sided knee pain which is very limiting for her.  Current pain is severe and constant.  She comes in today wanting to discuss possibly scheduling a replacement surgery.  Of note, she is about 3 months out from the right knee unicompartment arthroplasty.  She says the right knee is doing great.    Exam: Left knee: Skin is benign.  Moderate medial joint line tenderness.  Good motion including full extension and flexion to about 120.  No instability.  No significant patellofemoral or lateral sided tenderness.  Negative medial and lateral Rogelio's test.  Intact motor and sensory function in her lower leg and foot.  Brisk capillary refill.    Imaging: A lateral view of the left knee was ordered and reviewed today.  We have AP and merchant views from April when we saw her for the right knee.  Also have a full length alignment view from her presurgical visit.  She has moderate medial compartment osteoarthritis with joint space narrowing, osteophyte formation and subchondral sclerosis.  No other significant findings noted.    Assessment: Left knee medial compartment osteoarthritis    Plan: We thoroughly discussed her options, both surgical and nonsurgical.  She had a good understanding of this information from her prior experience on the right side.  She wants to move forward with scheduling the arthroplasty.  The risk, benefits and alternatives to a unicompartment arthroplasty were discussed.  I will have my  contact her about setting this up.  She will follow-up with myself or Z[facility] for a preoperative visit.    Celestine Beltran MD    06/07/2024    Answers submitted by the  patient for this visit:  Other (Submitted on 5/31/2024)  Please describe your symptoms.: Left knee issues  Have you had these symptoms before?: Yes  How long have you been having these symptoms?: Greater than 2 weeks  Please list any medications you are currently taking for this condition.: Tylenol  Please describe any probable cause for these symptoms. : Since miniscus surgery in 2020  Primary Reason for Visit (Submitted on 5/31/2024)  What is the primary reason for your visit?: Other

## 2024-06-10 ENCOUNTER — TREATMENT (OUTPATIENT)
Dept: PHYSICAL THERAPY | Facility: CLINIC | Age: 64
End: 2024-06-10
Payer: COMMERCIAL

## 2024-06-10 DIAGNOSIS — Z47.89 ORTHOPEDIC AFTERCARE: ICD-10-CM

## 2024-06-10 DIAGNOSIS — R26.9 GAIT DISTURBANCE: ICD-10-CM

## 2024-06-10 DIAGNOSIS — M25.561 ACUTE PAIN OF RIGHT KNEE: Primary | ICD-10-CM

## 2024-06-10 PROCEDURE — 97530 THERAPEUTIC ACTIVITIES: CPT | Performed by: PHYSICAL THERAPIST

## 2024-06-10 PROCEDURE — 97112 NEUROMUSCULAR REEDUCATION: CPT | Performed by: PHYSICAL THERAPIST

## 2024-06-10 PROCEDURE — 97110 THERAPEUTIC EXERCISES: CPT | Performed by: PHYSICAL THERAPIST

## 2024-06-10 NOTE — PROGRESS NOTES
Physical Therapy Daily Treatment Note  Visit: 19    Berenice Granger reports: returned to MD with plan to schedule L knee replacement this fall.   YOB: 1960  Referring practitioner : Celestine Beltran MD  Date of Initial: Type: THERAPY  Noted: 3/20/2024  Today's date: 6/10/2024  Patient seen for 19 sessions    Visit Diagnoses:    ICD-10-CM ICD-9-CM   1. Acute pain of right knee  M25.561 719.46   2. Gait disturbance  R26.9 781.2   3. Orthopedic aftercare  Z47.89 V54.9       Subjective       Objective   See Exercise, Manual, and Modality Logs for complete treatment.       Assessment/Plan    Improving R quad/knee EXT strength and tolerance with squatting/stairs.  Still more limited with L knee pain with unilateral or resistive squatting/stairs.     Plan:    Continue            Timed:           Therapeutic Exercise:    15     mins  51276;     Neuromuscular Lobo:    15    mins  19420;    Therapeutic Activity:     10     mins  23702;         Timed Treatment:   40   mins   Total Treatment:     40   mins         aMrkell Cr PT, DPT  Physical Therapist  [place] Lic #[id number]

## 2024-06-11 ENCOUNTER — TELEPHONE (OUTPATIENT)
Dept: ORTHOPEDIC SURGERY | Facility: CLINIC | Age: 64
End: 2024-06-11
Payer: COMMERCIAL

## 2024-06-11 NOTE — TELEPHONE ENCOUNTER
Call returned to the patient.  Will schedule her for surgery on September 24, 2024.  Information has been sent to the patient

## 2024-06-11 NOTE — TELEPHONE ENCOUNTER
----- Message from Susanne SARGENT sent at 6/11/2024 10:08 AM EDT -----  Regarding: FW: Upcoming surgery to schedule   Contact: [phone]    ----- Message -----  From: Berenice Granger  Sent: 6/11/2024  10:07 AM EDT  To: [facility]  Subject: Upcoming surgery to schedule                     Hi! Checking to see if I need to call to schedule my upcoming partial knee replacement surgery on left knee? I know it will take some time to deal with insurance… just wanted to get on the books and get approved soon… Thanks

## 2024-06-12 ENCOUNTER — TREATMENT (OUTPATIENT)
Dept: PHYSICAL THERAPY | Facility: CLINIC | Age: 64
End: 2024-06-12
Payer: COMMERCIAL

## 2024-06-12 DIAGNOSIS — Z47.89 ORTHOPEDIC AFTERCARE: ICD-10-CM

## 2024-06-12 DIAGNOSIS — M25.561 ACUTE PAIN OF RIGHT KNEE: Primary | ICD-10-CM

## 2024-06-12 DIAGNOSIS — R26.9 GAIT DISTURBANCE: ICD-10-CM

## 2024-06-12 NOTE — PROGRESS NOTES
Physical Therapy Daily Treatment Note  Visit: 20    Berenice Granger reports: feeling better today - less soreness in both knees.   YOB: 1960  Referring practitioner : Celestine Beltran MD  Date of Initial: Type: THERAPY  Noted: 3/20/2024  Today's date: 6/12/2024  Patient seen for 20 sessions    Visit Diagnoses:    ICD-10-CM ICD-9-CM   1. Acute pain of right knee  M25.561 719.46   2. Orthopedic aftercare  Z47.89 V54.9   3. Gait disturbance  R26.9 781.2       Subjective       Objective   See Exercise, Manual, and Modality Logs for complete treatment.       Assessment/Plan    Continued improvement R knee strength/full ROM end of session and improving balance.  More limited per L knee pain in standing activities.     Plan:    Continue            Timed:         Manual Therapy:    8     mins  12663;     Therapeutic Exercise:    20     mins  25602;     Neuromuscular Lobo:    12    mins  61091;    Therapeutic Activity:     15     mins  26204;         Timed Treatment:   55   mins   Total Treatment:     55   mins         Markell Cr PT, DPT  Physical Therapist  [place] Lic #[id number]  
negative...

## 2024-06-17 ENCOUNTER — TREATMENT (OUTPATIENT)
Dept: PHYSICAL THERAPY | Facility: CLINIC | Age: 64
End: 2024-06-17
Payer: COMMERCIAL

## 2024-06-17 DIAGNOSIS — M25.561 ACUTE PAIN OF RIGHT KNEE: Primary | ICD-10-CM

## 2024-06-17 DIAGNOSIS — R26.9 GAIT DISTURBANCE: ICD-10-CM

## 2024-06-17 DIAGNOSIS — Z47.89 ORTHOPEDIC AFTERCARE: ICD-10-CM

## 2024-06-17 NOTE — PROGRESS NOTES
Physical Therapy Daily Treatment Note  Visit: 21    Berenice Granger reports: some soreness in her right knee after extended time in the car driving to and from concert yesterday.   YOB: 1960  Referring practitioner : Celestine Beltran MD  Date of Initial: Type: THERAPY  Noted: 3/20/2024  Today's date: 6/17/2024  Patient seen for 21 sessions    Visit Diagnoses:    ICD-10-CM ICD-9-CM   1. Acute pain of right knee  M25.561 719.46   2. Orthopedic aftercare  Z47.89 V54.9   3. Gait disturbance  R26.9 781.2       Subjective       Objective   See Exercise, Manual, and Modality Logs for complete treatment.       Assessment/Plan    Continued improvement in R knee ROM/strength.  More discomfort with unilateral WB R today.     Plan:    Continue current           Timed:             Therapeutic Exercise:    20     mins  56453;     Neuromuscular Lobo:    10    mins  63807;    Therapeutic Activity:     15     mins  40759;         Timed Treatment:   45   mins   Total Treatment:     45   mins         Markell Cr PT, DPT  Physical Therapist  [place] Lic #[id number]

## 2024-06-19 ENCOUNTER — TREATMENT (OUTPATIENT)
Dept: PHYSICAL THERAPY | Facility: CLINIC | Age: 64
End: 2024-06-19
Payer: COMMERCIAL

## 2024-06-19 DIAGNOSIS — Z47.89 ORTHOPEDIC AFTERCARE: ICD-10-CM

## 2024-06-19 DIAGNOSIS — M25.561 ACUTE PAIN OF RIGHT KNEE: Primary | ICD-10-CM

## 2024-06-19 DIAGNOSIS — R26.9 GAIT DISTURBANCE: ICD-10-CM

## 2024-06-19 NOTE — PROGRESS NOTES
Physical Therapy Daily Treatment Note  Visit: 22    Berenice Granger reports: sore in both knees after extended time on her feet yesterday.  Reports instruction for scar massage on incision was helpful.   YOB: 1960  Referring practitioner : Celestine Beltran MD  Date of Initial: Type: THERAPY  Noted: 3/20/2024  Today's date: 6/19/2024  Patient seen for 22 sessions    Visit Diagnoses:    ICD-10-CM ICD-9-CM   1. Acute pain of right knee  M25.561 719.46   2. Orthopedic aftercare  Z47.89 V54.9   3. Gait disturbance  R26.9 781.2       Subjective       Objective   See Exercise, Manual, and Modality Logs for complete treatment.       Assessment/Plan    Progressing well - improving R knee strength/function with full ROM end of tx.  Likely DC to HEP only next visit.            Timed:            Therapeutic Exercise:    33     mins  39212;     Neuromuscular Lobo:    9   mins  67241;    Therapeutic Activity:     12     mins  90463;         Timed Treatment:   54   mins   Total Treatment:     54   mins         Markell Cr PT, DPT  Physical Therapist  [place] Lic #[id number]

## 2024-06-21 ENCOUNTER — TREATMENT (OUTPATIENT)
Dept: PHYSICAL THERAPY | Facility: CLINIC | Age: 64
End: 2024-06-21
Payer: COMMERCIAL

## 2024-06-21 DIAGNOSIS — R26.9 GAIT DISTURBANCE: ICD-10-CM

## 2024-06-21 DIAGNOSIS — Z47.89 ORTHOPEDIC AFTERCARE: ICD-10-CM

## 2024-06-21 DIAGNOSIS — M25.561 ACUTE PAIN OF RIGHT KNEE: Primary | ICD-10-CM

## 2024-06-21 NOTE — PROGRESS NOTES
Physical Therapy Daily Progress Note      Patient: Berenice Granger   : 1960  Diagnosis/ICD-10 Code:  Acute pain of right knee [M25.561]  Referring practitioner: Celestine Beltran MD  Date of Initial Visit: Type: THERAPY  Noted: 3/20/2024  Today's Date: 2024  Patient seen for 23 sessions             Subjective Pt is ready to be Dc'd today.    Objective   See Exercise, Manual, and Modality Logs for complete treatment.       Assessment/Plan  No issues to report today.  Pt demonstrates good understanding of exercises.  DC from PT    Progress per Plan of Care           Timed:           Therapeutic Exercise:    20     mins  50196;     Neuromuscular Lobo:    10    mins  29836;    Therapeutic Activities     10   mins  03648      Timed Treatment:   40   mins   Total Treatment:     40   mins        Jorge Li PTA  Physical Therapist Assistant

## 2024-07-23 ENCOUNTER — TELEPHONE (OUTPATIENT)
Dept: ORTHOPEDIC SURGERY | Facility: CLINIC | Age: 64
End: 2024-07-23
Payer: COMMERCIAL

## 2024-07-23 NOTE — TELEPHONE ENCOUNTER
"Patient called during Epic outage on 7/19/24. She wanted to know if her surgery had been approved through insurance. She stated she previously had a surgery cancelled the day before and Dr. Beltran had to do a P2P. Patient stated her insurance requires \"urgent\" not standard for the approval to go through in a timely manner should that occur again. She wanted to make us aware.     I let her know the surgery had not yet been approved and it is early in the process, so it may not be submitted at this time. Patient expressed understanding and appreciated the communication. Sx date is 9/24/24 with Dr. Beltran  "

## 2024-07-23 NOTE — TELEPHONE ENCOUNTER
Called and spoke with patient and told her it was to early to get an approval for sx in September.  We will try to get it on 9/1/24.

## 2024-09-05 ENCOUNTER — PREP FOR SURGERY (OUTPATIENT)
Dept: OTHER | Facility: HOSPITAL | Age: 64
End: 2024-09-05
Payer: COMMERCIAL

## 2024-09-05 DIAGNOSIS — M17.12 PRIMARY LOCALIZED OSTEOARTHROSIS OF LEFT LOWER LEG: Primary | ICD-10-CM

## 2024-09-05 RX ORDER — CHLORHEXIDINE GLUCONATE 500 MG/1
CLOTH TOPICAL SEE ADMIN INSTRUCTIONS
OUTPATIENT
Start: 2024-09-05

## 2024-09-11 ENCOUNTER — PRE-ADMISSION TESTING (OUTPATIENT)
Dept: PREADMISSION TESTING | Facility: HOSPITAL | Age: 64
End: 2024-09-11
Payer: COMMERCIAL

## 2024-09-11 ENCOUNTER — TELEPHONE (OUTPATIENT)
Dept: ORTHOPEDIC SURGERY | Facility: HOSPITAL | Age: 64
End: 2024-09-11
Payer: COMMERCIAL

## 2024-09-11 VITALS
HEIGHT: 67 IN | HEART RATE: 75 BPM | DIASTOLIC BLOOD PRESSURE: 73 MMHG | TEMPERATURE: 97.8 F | RESPIRATION RATE: 20 BRPM | OXYGEN SATURATION: 100 % | SYSTOLIC BLOOD PRESSURE: 138 MMHG | WEIGHT: 144 LBS | BODY MASS INDEX: 22.6 KG/M2

## 2024-09-11 DIAGNOSIS — M17.12 PRIMARY LOCALIZED OSTEOARTHROSIS OF LEFT LOWER LEG: ICD-10-CM

## 2024-09-11 LAB
ANION GAP SERPL CALCULATED.3IONS-SCNC: 8.8 MMOL/L (ref 5–15)
BUN SERPL-MCNC: 10 MG/DL (ref 8–23)
BUN/CREAT SERPL: 14.1 (ref 7–25)
CALCIUM SPEC-SCNC: 9.6 MG/DL (ref 8.6–10.5)
CHLORIDE SERPL-SCNC: 104 MMOL/L (ref 98–107)
CO2 SERPL-SCNC: 27.2 MMOL/L (ref 22–29)
CREAT SERPL-MCNC: 0.71 MG/DL (ref 0.57–1)
DEPRECATED RDW RBC AUTO: 41.6 FL (ref 37–54)
EGFRCR SERPLBLD CKD-EPI 2021: 96.3 ML/MIN/1.73
ERYTHROCYTE [DISTWIDTH] IN BLOOD BY AUTOMATED COUNT: 12.3 % (ref 12.3–15.4)
GLUCOSE SERPL-MCNC: 92 MG/DL (ref 65–99)
HBA1C MFR BLD: 5.3 % (ref 4.8–5.6)
HCT VFR BLD AUTO: 39.6 % (ref 34–46.6)
HGB BLD-MCNC: 13.1 G/DL (ref 12–15.9)
MCH RBC QN AUTO: 30.5 PG (ref 26.6–33)
MCHC RBC AUTO-ENTMCNC: 33.1 G/DL (ref 31.5–35.7)
MCV RBC AUTO: 92.1 FL (ref 79–97)
PLATELET # BLD AUTO: 272 10*3/MM3 (ref 140–450)
PMV BLD AUTO: 9.2 FL (ref 6–12)
POTASSIUM SERPL-SCNC: 4 MMOL/L (ref 3.5–5.2)
RBC # BLD AUTO: 4.3 10*6/MM3 (ref 3.77–5.28)
SODIUM SERPL-SCNC: 140 MMOL/L (ref 136–145)
WBC NRBC COR # BLD AUTO: 4.76 10*3/MM3 (ref 3.4–10.8)

## 2024-09-11 PROCEDURE — 36415 COLL VENOUS BLD VENIPUNCTURE: CPT

## 2024-09-11 PROCEDURE — 80048 BASIC METABOLIC PNL TOTAL CA: CPT

## 2024-09-11 PROCEDURE — 85027 COMPLETE CBC AUTOMATED: CPT

## 2024-09-11 PROCEDURE — 83036 HEMOGLOBIN GLYCOSYLATED A1C: CPT

## 2024-09-11 NOTE — TELEPHONE ENCOUNTER
Risk Factor yes no   Age >75  X   BMI <20 >40  X   Patient History     Chronic Pain (2 or more meds/Pain Management)  X   ETOH (more than 3 drinks Daily)  X   Uncontrolled Depression/Bipolar/Schizoaffective Disorder  X   Arrhythmias--  X   Stent placement/MI  X   DVT/PE  X   Pacemaker  X   HTN (uncontrolled or requiring more than 2 medications)  X   CHF/Retained fluids/Edema  X   Stroke with Residual   X   COPD/Asthma  X   ABDIFATAH--Non-compliant with CPAP  X   Diabetes (on insulin or more than 2 meds)         A1C:  X   BPH/Urinary retention (on medication)  X   CKD  X   Home environment and support     Current ambulation status (use of cane, walker, W/C, Multiple falls/weakness)  X   Stairs to enter and throughout home X    Lives Alone  X   Doesn't have support at home  X   Outpatient Screening Assessment    Home needs: (Walker/BSC):  Has walker  ? Steps 6 with rail   Caregiver 24-48hrs post-discharge:       Discharge Plan:   OP PT    Prescriptions: Meds to bed    Home medications: No Medications   [] Blood thinner/anti-coag therapy--   [] BPH or diuretic--  [] BP meds--   [] Pain/Anti-inflammatories--  Pre-op Education:  Educate patient on spinal anesthesia/pain control:  ? patient verbalize understanding    Educate patient on hospital course/timeline:  ?  patient verbalize understanding    Joint Care Class:  ?  yes [] no  Notes: Left SDD 3/24

## 2024-09-11 NOTE — DISCHARGE INSTRUCTIONS
Take the following medications the morning of surgery:      LEVOTHYROXINE      If you are on prescription narcotic pain medication to control your pain you may also take that medication the morning of surgery.      General Instructions:     Do not eat solid food after midnight the night before surgery.  Clear liquids day of surgery are allowed but must be stopped at least two hours before your hospital arrival time.       Allowed clear liquids      Water, sodas, and tea or coffee with no cream or milk added.       12 to 20 ounces of a clear liquid that contains carbohydrates is recommended.  If non-diabetic, have Gatorade or Powerade.  If diabetic, have G2 or Powerade Zero.     Do not have liquids red in color.  Do not consume chicken, beef, pork or vegetable broth or bouillon cubes of any variety as they are not considered clear liquids and are not allowed.      Infants may have breast milk up to four hours before surgery.  Infants drinking formula may drink formula up to six hours before surgery.   Patients who avoid smoking, chewing tobacco and alcohol for 4 weeks prior to surgery have a reduced risk of post-operative complications.  Quit smoking as many days before surgery as you can.  Do not smoke, use chewing tobacco or drink alcohol the day of surgery.   If applicable bring your C-PAP/ BI-PAP machine in with you to preop day of surgery.  Bring any papers given to you in the doctor’s office.  Wear clean comfortable clothes.  Do not wear contact lenses, false eyelashes or make-up.  Bring a case for your glasses.   Bring crutches or walker if applicable.  Remove all piercings.  Leave jewelry and any other valuables at home.  Hair extensions with metal clips must be removed prior to surgery.  The Pre-Admission Testing nurse will instruct you to bring medications if unable to obtain an accurate list in Pre-Admission Testing.          Preventing a Surgical Site Infection:  For 2 to 3 days before surgery, avoid  shaving with a razor because the razor can irritate skin and make it easier to develop an infection.    Any areas of open skin can increase the risk of a post-operative wound infection by allowing bacteria to enter and travel throughout the body.  Notify your surgeon if you have any skin wounds / rashes even if it is not near the expected surgical site.  The area will need assessed to determine if surgery should be delayed until it is healed.  The night prior to surgery shower using a fresh bar of anti-bacterial soap (such as Dial) and clean washcloth.  Sleep in a clean bed with clean clothing.  Do not allow pets to sleep with you.  Shower on the morning of surgery using a fresh bar of anti-bacterial soap (such as Dial) and clean washcloth.  Dry with a clean towel and dress in clean clothing.  Ask your surgeon if you will be receiving antibiotics prior to surgery.  Make sure you, your family, and all healthcare providers clean their hands with soap and water or an alcohol based hand  before caring for you or your wound.    Day of surgery:  Your arrival time is approximately two hours before your scheduled surgery time.  Please note if you have an early arrival time the surgery doors do not open before 5:00 AM.  Upon arrival, a Pre-op nurse and Anesthesiologist will review your health history, obtain vital signs, and answer questions you may have.  The only belongings needed at this time will be a list of your home medications and if applicable your C-PAP/BI-PAP machine.  A Pre-op nurse will start an IV and you may receive medication in preparation for surgery, including something to help you relax.     Please be aware that surgery does come with discomfort.  We want to make every effort to control your discomfort so please discuss any uncontrolled symptoms with your nurse.   Your doctor will most likely have prescribed pain medications.      If you are going home after surgery you will receive  individualized written care instructions before being discharged.  A responsible adult must drive you to and from the hospital on the day of your surgery and ideally stay with you through the night.   .  Discharge prescriptions can be filled by the hospital pharmacy during regular pharmacy hours.  If you are having surgery late in the day/evening your prescription may be e-prescribed to your pharmacy.  Please verify your pharmacy hours or chose a 24 hour pharmacy to avoid not having access to your prescription because your pharmacy has closed for the day.    If you are staying overnight following surgery, you will be transported to your hospital room following the recovery period.  [facility] has all private rooms.    If you have any questions please call Pre-Admission Testing at [phone].  Deductibles and co-payments are collected on the day of service. Please be prepared to pay the required co-pay, deductible or deposit on the day of service as defined by your plan.    Call your surgeon immediately if you experience any of the following symptoms:  Sore Throat  Shortness of Breath or difficulty breathing  Cough  Chills  Body soreness or muscle pain  Headache  Fever  New loss of taste or smell  Do not arrive for your surgery ill.  Your procedure will need to be rescheduled to another time.  You will need to call your physician before the day of surgery to avoid any unnecessary exposure to hospital staff as well as other patients.          CHLORHEXIDINE CLOTH INSTRUCTIONS  The morning of surgery follow these instructions using the Chlorhexidine cloths you've been given.  These steps reduce bacteria on the body.  Do not use the cloths near your eyes, ears mouth, genitalia or on open wounds.  Throw the cloths away after use but do not try to flush them down a toilet.      Open and remove one cloth at a time from the package.    Leave the cloth unfolded and begin the bathing.  Massage the skin with  the cloths using gentle pressure to remove bacteria.  Do not scrub harshly.   Follow the steps below with one 2% CHG cloth per area (6 total cloths).  One cloth for neck, shoulders and chest.  One cloth for both arms, hands, fingers and underarms (do underarms last).  One cloth for the abdomen followed by groin.  One cloth for right leg and foot including between the toes.  One cloth for left leg and foot including between the toes.  The last cloth is to be used for the back of the neck, back and buttocks.    Allow the CHG to air dry 3 minutes on the skin which will give it time to work and decrease the chance of irritation.  The skin may feel sticky until it is dry.  Do not rinse with water or any other liquid or you will lose the beneficial effects of the CHG.  If mild skin irritation occurs, do rinse the skin to remove the CHG.  Report this to the nurse at time of admission.  Do not apply lotions, creams, ointments, deodorants or perfumes after using the clothes. Dress in clean clothes before coming to the hospital.

## 2024-09-20 ENCOUNTER — OFFICE VISIT (OUTPATIENT)
Dept: ORTHOPEDIC SURGERY | Facility: CLINIC | Age: 64
End: 2024-09-20
Payer: COMMERCIAL

## 2024-09-20 VITALS — BODY MASS INDEX: 21.71 KG/M2 | WEIGHT: 138.3 LBS | HEIGHT: 67 IN | TEMPERATURE: 97.8 F

## 2024-09-20 DIAGNOSIS — Z01.818 PREOP EXAMINATION: Primary | ICD-10-CM

## 2024-09-24 ENCOUNTER — HOSPITAL ENCOUNTER (OUTPATIENT)
Facility: HOSPITAL | Age: 64
Discharge: HOME | End: 2024-09-24
Attending: ORTHOPAEDIC SURGERY | Admitting: ORTHOPAEDIC SURGERY
Payer: COMMERCIAL

## 2024-09-24 ENCOUNTER — ANESTHESIA EVENT (OUTPATIENT)
Dept: PERIOP | Facility: HOSPITAL | Age: 64
End: 2024-09-24
Payer: COMMERCIAL

## 2024-09-24 ENCOUNTER — ANESTHESIA (OUTPATIENT)
Dept: PERIOP | Facility: HOSPITAL | Age: 64
End: 2024-09-24
Payer: COMMERCIAL

## 2024-09-24 ENCOUNTER — APPOINTMENT (OUTPATIENT)
Dept: GENERAL RADIOLOGY | Facility: HOSPITAL | Age: 64
End: 2024-09-24
Payer: COMMERCIAL

## 2024-09-24 VITALS
BODY MASS INDEX: 21.7 KG/M2 | HEART RATE: 86 BPM | SYSTOLIC BLOOD PRESSURE: 122 MMHG | RESPIRATION RATE: 16 BRPM | TEMPERATURE: 97.9 F | DIASTOLIC BLOOD PRESSURE: 62 MMHG | OXYGEN SATURATION: 97 % | WEIGHT: 138.23 LBS | HEIGHT: 67 IN

## 2024-09-24 DIAGNOSIS — M17.10 ARTHRITIS OF KNEE: ICD-10-CM

## 2024-09-24 DIAGNOSIS — Z96.659 HISTORY OF PARTIAL KNEE REPLACEMENT: Primary | ICD-10-CM

## 2024-09-24 PROBLEM — M17.9 OSTEOARTHRITIS OF KNEE: Status: ACTIVE | Noted: 2024-09-24

## 2024-09-24 PROCEDURE — C1776 JOINT DEVICE (IMPLANTABLE): HCPCS | Performed by: ORTHOPAEDIC SURGERY

## 2024-09-24 PROCEDURE — 25010000002 EPINEPHRINE 1 MG/ML SOLUTION 30 ML VIAL: Performed by: ORTHOPAEDIC SURGERY

## 2024-09-24 PROCEDURE — 25010000002 FENTANYL CITRATE (PF) 50 MCG/ML SOLUTION: Performed by: ANESTHESIOLOGY

## 2024-09-24 PROCEDURE — 25010000002 MORPHINE PER 10 MG: Performed by: ORTHOPAEDIC SURGERY

## 2024-09-24 PROCEDURE — 73560 X-RAY EXAM OF KNEE 1 OR 2: CPT

## 2024-09-24 PROCEDURE — 25010000002 CEFAZOLIN PER 500 MG: Performed by: ORTHOPAEDIC SURGERY

## 2024-09-24 PROCEDURE — 25010000002 PROPOFOL 200 MG/20ML EMULSION: Performed by: NURSE ANESTHETIST, CERTIFIED REGISTERED

## 2024-09-24 PROCEDURE — G0378 HOSPITAL OBSERVATION PER HR: HCPCS

## 2024-09-24 PROCEDURE — 97110 THERAPEUTIC EXERCISES: CPT | Performed by: PHYSICAL THERAPIST

## 2024-09-24 PROCEDURE — 25810000003 LACTATED RINGERS SOLUTION: Performed by: ORTHOPAEDIC SURGERY

## 2024-09-24 PROCEDURE — 25010000002 ONDANSETRON PER 1 MG: Performed by: NURSE ANESTHETIST, CERTIFIED REGISTERED

## 2024-09-24 PROCEDURE — 25010000002 ROPIVACAINE PER 1 MG: Performed by: ANESTHESIOLOGY

## 2024-09-24 PROCEDURE — C1713 ANCHOR/SCREW BN/BN,TIS/BN: HCPCS | Performed by: ORTHOPAEDIC SURGERY

## 2024-09-24 PROCEDURE — 25010000002 ROPIVACAINE PER 1 MG: Performed by: ORTHOPAEDIC SURGERY

## 2024-09-24 PROCEDURE — 25010000002 SUGAMMADEX 200 MG/2ML SOLUTION: Performed by: NURSE ANESTHETIST, CERTIFIED REGISTERED

## 2024-09-24 PROCEDURE — 25810000003 SODIUM CHLORIDE 0.9 % SOLUTION 250 ML FLEX CONT: Performed by: ORTHOPAEDIC SURGERY

## 2024-09-24 PROCEDURE — 63710000001 PROMETHAZINE PER 25 MG: Performed by: ANESTHESIOLOGY

## 2024-09-24 PROCEDURE — 25810000003 LACTATED RINGERS PER 1000 ML: Performed by: ANESTHESIOLOGY

## 2024-09-24 PROCEDURE — 27446 REVISION OF KNEE JOINT: CPT | Performed by: ORTHOPAEDIC SURGERY

## 2024-09-24 PROCEDURE — 27446 REVISION OF KNEE JOINT: CPT | Performed by: TECHNICIAN, OTHER

## 2024-09-24 PROCEDURE — 97162 PT EVAL MOD COMPLEX 30 MIN: CPT | Performed by: PHYSICAL THERAPIST

## 2024-09-24 PROCEDURE — 25010000002 KETOROLAC TROMETHAMINE PER 15 MG: Performed by: ORTHOPAEDIC SURGERY

## 2024-09-24 PROCEDURE — 25010000002 DEXAMETHASONE SODIUM PHOSPHATE 20 MG/5ML SOLUTION: Performed by: ANESTHESIOLOGY

## 2024-09-24 PROCEDURE — 25010000002 VANCOMYCIN 1 G RECONSTITUTED SOLUTION 1 EACH VIAL: Performed by: ORTHOPAEDIC SURGERY

## 2024-09-24 PROCEDURE — 25010000002 MIDAZOLAM PER 1 MG: Performed by: ANESTHESIOLOGY

## 2024-09-24 PROCEDURE — 25010000002 HYDROMORPHONE PER 4 MG: Performed by: NURSE ANESTHETIST, CERTIFIED REGISTERED

## 2024-09-24 DEVICE — JOURNEY II UNI MEDIAL TIBIA                                    BASEPLATE SZ 6 LEFT
Type: IMPLANTABLE DEVICE | Site: KNEE | Status: FUNCTIONAL
Brand: JOURNEY II UNI

## 2024-09-24 DEVICE — PALACOS® R IS A FAST-CURING, RADIOPAQUE, POLY(METHYL METHACRYLATE)-BASED BONE CEMENT.PALACOS ® R CONTAINS THE X-RAY CONTRAST MEDIUM ZIRCONIUM DIOXIDE. TO IMPROVE VISIBILITY IN THE SURGICAL FIELD PALACOS ® R HAS BEEN COLOURED WITH CHLOROPHYLL (E141). THE BONE CEMENT IS PREPARED DIRECTLY BEFORE USE BY MIXING A POLYMER POWDER COMPONENT WITH A LIQUID MONOMER COMPONENT. A DUCTILE DOUGH FORMS WHICH CURES WITHIN A FEW MINUTES.
Type: IMPLANTABLE DEVICE | Site: KNEE | Status: FUNCTIONAL
Brand: PALACOS®

## 2024-09-24 DEVICE — JOURNEY II UNI XLPE TIBIA INSERT                                    MEDIAL SZ 5-6 8MM
Type: IMPLANTABLE DEVICE | Site: KNEE | Status: FUNCTIONAL
Brand: JOURNEY II UNI

## 2024-09-24 DEVICE — KNOTLESS TISSUE CONTROL DEVICE, UNDYED UNIDIRECTIONAL (ANTIBACTERIAL) SYNTHETIC ABSORBABLE DEVICE
Type: IMPLANTABLE DEVICE | Site: KNEE | Status: FUNCTIONAL
Brand: STRATAFIX

## 2024-09-24 DEVICE — IMPLANTABLE DEVICE: Type: IMPLANTABLE DEVICE | Status: FUNCTIONAL

## 2024-09-24 DEVICE — VIOLET ANTIBACTERIAL POLYDIOXANONE, KNOTLESS TISSUE CONTROL DEVICE
Type: IMPLANTABLE DEVICE | Site: KNEE | Status: FUNCTIONAL
Brand: STRATAFIX

## 2024-09-24 DEVICE — JOURNEY II UNI OXINIUM FEMORAL                                    COMPONENT 5 LM/RL
Type: IMPLANTABLE DEVICE | Site: KNEE | Status: FUNCTIONAL
Brand: JOURNEY II UNI

## 2024-09-24 RX ORDER — PROMETHAZINE HYDROCHLORIDE 25 MG/1
25 SUPPOSITORY RECTAL ONCE AS NEEDED
Status: DISCONTINUED | OUTPATIENT
Start: 2024-09-24 | End: 2024-09-24 | Stop reason: HOSPADM

## 2024-09-24 RX ORDER — OXYCODONE AND ACETAMINOPHEN 7.5; 325 MG/1; MG/1
1 TABLET ORAL EVERY 4 HOURS PRN
Status: DISCONTINUED | OUTPATIENT
Start: 2024-09-24 | End: 2024-09-24 | Stop reason: HOSPADM

## 2024-09-24 RX ORDER — PROMETHAZINE HYDROCHLORIDE 25 MG/1
12.5 TABLET ORAL ONCE
Status: COMPLETED | OUTPATIENT
Start: 2024-09-24 | End: 2024-09-24

## 2024-09-24 RX ORDER — DROPERIDOL 2.5 MG/ML
0.62 INJECTION, SOLUTION INTRAMUSCULAR; INTRAVENOUS
Status: DISCONTINUED | OUTPATIENT
Start: 2024-09-24 | End: 2024-09-24 | Stop reason: HOSPADM

## 2024-09-24 RX ORDER — ACETAMINOPHEN 325 MG/1
650 TABLET ORAL 2 TIMES DAILY PRN
Qty: 60 TABLET | Refills: 0 | Status: SHIPPED | OUTPATIENT
Start: 2024-09-24

## 2024-09-24 RX ORDER — SODIUM CHLORIDE 0.9 % (FLUSH) 0.9 %
3 SYRINGE (ML) INJECTION EVERY 12 HOURS SCHEDULED
Status: DISCONTINUED | OUTPATIENT
Start: 2024-09-24 | End: 2024-09-24 | Stop reason: HOSPADM

## 2024-09-24 RX ORDER — FENTANYL CITRATE 50 UG/ML
50 INJECTION, SOLUTION INTRAMUSCULAR; INTRAVENOUS
Status: DISCONTINUED | OUTPATIENT
Start: 2024-09-24 | End: 2024-09-24 | Stop reason: HOSPADM

## 2024-09-24 RX ORDER — SODIUM CHLORIDE, SODIUM LACTATE, POTASSIUM CHLORIDE, CALCIUM CHLORIDE 600; 310; 30; 20 MG/100ML; MG/100ML; MG/100ML; MG/100ML
9 INJECTION, SOLUTION INTRAVENOUS CONTINUOUS
Status: DISCONTINUED | OUTPATIENT
Start: 2024-09-24 | End: 2024-09-24 | Stop reason: HOSPADM

## 2024-09-24 RX ORDER — SODIUM CHLORIDE 0.9 % (FLUSH) 0.9 %
3-10 SYRINGE (ML) INJECTION AS NEEDED
Status: DISCONTINUED | OUTPATIENT
Start: 2024-09-24 | End: 2024-09-24 | Stop reason: HOSPADM

## 2024-09-24 RX ORDER — MELOXICAM 15 MG/1
15 TABLET ORAL ONCE
Status: COMPLETED | OUTPATIENT
Start: 2024-09-24 | End: 2024-09-24

## 2024-09-24 RX ORDER — ONDANSETRON 4 MG/1
4 TABLET, FILM COATED ORAL EVERY 8 HOURS PRN
Qty: 12 TABLET | Refills: 0 | Status: SHIPPED | OUTPATIENT
Start: 2024-09-24

## 2024-09-24 RX ORDER — ASPIRIN 81 MG/1
81 TABLET ORAL ONCE
Status: DISCONTINUED | OUTPATIENT
Start: 2024-09-24 | End: 2024-09-24 | Stop reason: HOSPADM

## 2024-09-24 RX ORDER — ONDANSETRON 2 MG/ML
INJECTION INTRAMUSCULAR; INTRAVENOUS AS NEEDED
Status: DISCONTINUED | OUTPATIENT
Start: 2024-09-24 | End: 2024-09-24 | Stop reason: SURG

## 2024-09-24 RX ORDER — ONDANSETRON 2 MG/ML
4 INJECTION INTRAMUSCULAR; INTRAVENOUS ONCE AS NEEDED
Status: DISCONTINUED | OUTPATIENT
Start: 2024-09-24 | End: 2024-09-24 | Stop reason: HOSPADM

## 2024-09-24 RX ORDER — HYDROCODONE BITARTRATE AND ACETAMINOPHEN 5; 325 MG/1; MG/1
1 TABLET ORAL ONCE AS NEEDED
Status: COMPLETED | OUTPATIENT
Start: 2024-09-24 | End: 2024-09-24

## 2024-09-24 RX ORDER — LIDOCAINE HYDROCHLORIDE 20 MG/ML
INJECTION, SOLUTION EPIDURAL; INFILTRATION; INTRACAUDAL; PERINEURAL AS NEEDED
Status: DISCONTINUED | OUTPATIENT
Start: 2024-09-24 | End: 2024-09-24 | Stop reason: SURG

## 2024-09-24 RX ORDER — EPHEDRINE SULFATE 50 MG/ML
INJECTION INTRAVENOUS AS NEEDED
Status: DISCONTINUED | OUTPATIENT
Start: 2024-09-24 | End: 2024-09-24 | Stop reason: SURG

## 2024-09-24 RX ORDER — PROMETHAZINE HYDROCHLORIDE 25 MG/1
25 TABLET ORAL ONCE AS NEEDED
Status: DISCONTINUED | OUTPATIENT
Start: 2024-09-24 | End: 2024-09-24 | Stop reason: HOSPADM

## 2024-09-24 RX ORDER — IPRATROPIUM BROMIDE AND ALBUTEROL SULFATE 2.5; .5 MG/3ML; MG/3ML
3 SOLUTION RESPIRATORY (INHALATION) ONCE AS NEEDED
Status: DISCONTINUED | OUTPATIENT
Start: 2024-09-24 | End: 2024-09-24 | Stop reason: HOSPADM

## 2024-09-24 RX ORDER — MIDAZOLAM HYDROCHLORIDE 1 MG/ML
1 INJECTION INTRAMUSCULAR; INTRAVENOUS
Status: DISCONTINUED | OUTPATIENT
Start: 2024-09-24 | End: 2024-09-24 | Stop reason: HOSPADM

## 2024-09-24 RX ORDER — ROCURONIUM BROMIDE 10 MG/ML
INJECTION, SOLUTION INTRAVENOUS AS NEEDED
Status: DISCONTINUED | OUTPATIENT
Start: 2024-09-24 | End: 2024-09-24 | Stop reason: SURG

## 2024-09-24 RX ORDER — TRANEXAMIC ACID 100 MG/ML
INJECTION, SOLUTION INTRAVENOUS AS NEEDED
Status: DISCONTINUED | OUTPATIENT
Start: 2024-09-24 | End: 2024-09-24 | Stop reason: SURG

## 2024-09-24 RX ORDER — HYDRALAZINE HYDROCHLORIDE 20 MG/ML
5 INJECTION INTRAMUSCULAR; INTRAVENOUS
Status: DISCONTINUED | OUTPATIENT
Start: 2024-09-24 | End: 2024-09-24 | Stop reason: HOSPADM

## 2024-09-24 RX ORDER — ONDANSETRON 4 MG/1
4 TABLET, ORALLY DISINTEGRATING ORAL ONCE AS NEEDED
Status: DISCONTINUED | OUTPATIENT
Start: 2024-09-24 | End: 2024-09-24 | Stop reason: HOSPADM

## 2024-09-24 RX ORDER — PROPOFOL 10 MG/ML
INJECTION, EMULSION INTRAVENOUS AS NEEDED
Status: DISCONTINUED | OUTPATIENT
Start: 2024-09-24 | End: 2024-09-24 | Stop reason: SURG

## 2024-09-24 RX ORDER — ASPIRIN 81 MG/1
81 TABLET ORAL EVERY 12 HOURS SCHEDULED
Status: CANCELLED | OUTPATIENT
Start: 2024-09-25

## 2024-09-24 RX ORDER — PREGABALIN 75 MG/1
150 CAPSULE ORAL ONCE
Status: COMPLETED | OUTPATIENT
Start: 2024-09-24 | End: 2024-09-24

## 2024-09-24 RX ORDER — FLUMAZENIL 0.1 MG/ML
0.2 INJECTION INTRAVENOUS AS NEEDED
Status: DISCONTINUED | OUTPATIENT
Start: 2024-09-24 | End: 2024-09-24 | Stop reason: HOSPADM

## 2024-09-24 RX ORDER — NALOXONE HCL 0.4 MG/ML
0.2 VIAL (ML) INJECTION AS NEEDED
Status: DISCONTINUED | OUTPATIENT
Start: 2024-09-24 | End: 2024-09-24 | Stop reason: HOSPADM

## 2024-09-24 RX ORDER — ACETAMINOPHEN 325 MG/1
1000 TABLET ORAL ONCE
Status: COMPLETED | OUTPATIENT
Start: 2024-09-24 | End: 2024-09-24

## 2024-09-24 RX ORDER — FAMOTIDINE 10 MG/ML
20 INJECTION, SOLUTION INTRAVENOUS ONCE
Status: COMPLETED | OUTPATIENT
Start: 2024-09-24 | End: 2024-09-24

## 2024-09-24 RX ORDER — ROPIVACAINE HYDROCHLORIDE 5 MG/ML
INJECTION, SOLUTION EPIDURAL; INFILTRATION; PERINEURAL
Status: COMPLETED | OUTPATIENT
Start: 2024-09-24 | End: 2024-09-24

## 2024-09-24 RX ORDER — LABETALOL HYDROCHLORIDE 5 MG/ML
5 INJECTION, SOLUTION INTRAVENOUS
Status: DISCONTINUED | OUTPATIENT
Start: 2024-09-24 | End: 2024-09-24 | Stop reason: HOSPADM

## 2024-09-24 RX ORDER — DOCUSATE SODIUM 100 MG/1
100 CAPSULE, LIQUID FILLED ORAL 2 TIMES DAILY
Qty: 60 CAPSULE | Refills: 0 | Status: SHIPPED | OUTPATIENT
Start: 2024-09-24

## 2024-09-24 RX ORDER — DEXAMETHASONE SODIUM PHOSPHATE 4 MG/ML
INJECTION, SOLUTION INTRA-ARTICULAR; INTRALESIONAL; INTRAMUSCULAR; INTRAVENOUS; SOFT TISSUE
Status: COMPLETED | OUTPATIENT
Start: 2024-09-24 | End: 2024-09-24

## 2024-09-24 RX ORDER — HYDROMORPHONE HYDROCHLORIDE 1 MG/ML
0.5 INJECTION, SOLUTION INTRAMUSCULAR; INTRAVENOUS; SUBCUTANEOUS
Status: DISCONTINUED | OUTPATIENT
Start: 2024-09-24 | End: 2024-09-24 | Stop reason: HOSPADM

## 2024-09-24 RX ORDER — EPHEDRINE SULFATE 50 MG/ML
5 INJECTION, SOLUTION INTRAVENOUS ONCE AS NEEDED
Status: DISCONTINUED | OUTPATIENT
Start: 2024-09-24 | End: 2024-09-24 | Stop reason: HOSPADM

## 2024-09-24 RX ORDER — DIPHENHYDRAMINE HYDROCHLORIDE 50 MG/ML
12.5 INJECTION INTRAMUSCULAR; INTRAVENOUS
Status: DISCONTINUED | OUTPATIENT
Start: 2024-09-24 | End: 2024-09-24 | Stop reason: HOSPADM

## 2024-09-24 RX ORDER — HYDROCODONE BITARTRATE AND ACETAMINOPHEN 7.5; 325 MG/1; MG/1
1 TABLET ORAL EVERY 6 HOURS PRN
Qty: 30 TABLET | Refills: 0 | Status: SHIPPED | OUTPATIENT
Start: 2024-09-24

## 2024-09-24 RX ORDER — MAGNESIUM HYDROXIDE 1200 MG/15ML
LIQUID ORAL AS NEEDED
Status: DISCONTINUED | OUTPATIENT
Start: 2024-09-24 | End: 2024-09-24 | Stop reason: HOSPADM

## 2024-09-24 RX ORDER — ASPIRIN 81 MG/1
81 TABLET ORAL 2 TIMES DAILY
Qty: 84 TABLET | Refills: 0 | Status: SHIPPED | OUTPATIENT
Start: 2024-09-24

## 2024-09-24 RX ORDER — KETOROLAC TROMETHAMINE 30 MG/ML
15 INJECTION, SOLUTION INTRAMUSCULAR; INTRAVENOUS ONCE AS NEEDED
Status: DISCONTINUED | OUTPATIENT
Start: 2024-09-24 | End: 2024-09-24 | Stop reason: HOSPADM

## 2024-09-24 RX ADMIN — EPHEDRINE SULFATE 10 MG: 50 INJECTION INTRAVENOUS at 08:11

## 2024-09-24 RX ADMIN — HYDROCODONE BITARTRATE AND ACETAMINOPHEN 1 TABLET: 5; 325 TABLET ORAL at 09:02

## 2024-09-24 RX ADMIN — ROCURONIUM BROMIDE 50 MG: 10 INJECTION, SOLUTION INTRAVENOUS at 07:11

## 2024-09-24 RX ADMIN — FAMOTIDINE 20 MG: 10 INJECTION INTRAVENOUS at 06:20

## 2024-09-24 RX ADMIN — PROPOFOL 150 MG: 10 INJECTION, EMULSION INTRAVENOUS at 07:11

## 2024-09-24 RX ADMIN — SUGAMMADEX 200 MG: 100 INJECTION, SOLUTION INTRAVENOUS at 08:36

## 2024-09-24 RX ADMIN — EPHEDRINE SULFATE 10 MG: 50 INJECTION INTRAVENOUS at 08:18

## 2024-09-24 RX ADMIN — PREGABALIN 150 MG: 75 CAPSULE ORAL at 05:46

## 2024-09-24 RX ADMIN — ONDANSETRON 4 MG: 2 INJECTION INTRAMUSCULAR; INTRAVENOUS at 07:25

## 2024-09-24 RX ADMIN — ROPIVACAINE HYDROCHLORIDE 20 ML: 5 INJECTION EPIDURAL; INFILTRATION; PERINEURAL at 06:25

## 2024-09-24 RX ADMIN — LIDOCAINE HYDROCHLORIDE 60 MG: 20 INJECTION, SOLUTION EPIDURAL; INFILTRATION; INTRACAUDAL; PERINEURAL at 07:11

## 2024-09-24 RX ADMIN — SODIUM CHLORIDE 2 G: 900 INJECTION INTRAVENOUS at 07:00

## 2024-09-24 RX ADMIN — SODIUM CHLORIDE, POTASSIUM CHLORIDE, SODIUM LACTATE AND CALCIUM CHLORIDE: 600; 310; 30; 20 INJECTION, SOLUTION INTRAVENOUS at 08:25

## 2024-09-24 RX ADMIN — PROMETHAZINE HYDROCHLORIDE 12.5 MG: 25 TABLET ORAL at 06:19

## 2024-09-24 RX ADMIN — EPHEDRINE SULFATE 10 MG: 50 INJECTION INTRAVENOUS at 07:31

## 2024-09-24 RX ADMIN — HYDROMORPHONE HYDROCHLORIDE 0.5 MG: 1 INJECTION, SOLUTION INTRAMUSCULAR; INTRAVENOUS; SUBCUTANEOUS at 08:57

## 2024-09-24 RX ADMIN — FENTANYL CITRATE 50 MCG: 50 INJECTION, SOLUTION INTRAMUSCULAR; INTRAVENOUS at 06:19

## 2024-09-24 RX ADMIN — SODIUM CHLORIDE 1000 MG: 0.9 INJECTION, SOLUTION INTRAVENOUS at 05:59

## 2024-09-24 RX ADMIN — DEXAMETHASONE SODIUM PHOSPHATE 4 MG: 4 INJECTION, SOLUTION INTRAMUSCULAR; INTRAVENOUS at 06:25

## 2024-09-24 RX ADMIN — ROCURONIUM BROMIDE 10 MG: 10 INJECTION, SOLUTION INTRAVENOUS at 07:30

## 2024-09-24 RX ADMIN — TRANEXAMIC ACID 1000 MG: 100 INJECTION, SOLUTION INTRAVENOUS at 08:16

## 2024-09-24 RX ADMIN — DEXAMETHASONE SODIUM PHOSPHATE 4 MG: 4 INJECTION, SOLUTION INTRAMUSCULAR; INTRAVENOUS at 07:25

## 2024-09-24 RX ADMIN — ACETAMINOPHEN 325MG 975 MG: 325 TABLET ORAL at 05:46

## 2024-09-24 RX ADMIN — MELOXICAM 15 MG: 15 TABLET ORAL at 05:46

## 2024-09-24 RX ADMIN — MIDAZOLAM 1 MG: 1 INJECTION INTRAMUSCULAR; INTRAVENOUS at 06:19

## 2024-09-24 RX ADMIN — SODIUM CHLORIDE, POTASSIUM CHLORIDE, SODIUM LACTATE AND CALCIUM CHLORIDE 500 ML: 600; 310; 30; 20 INJECTION, SOLUTION INTRAVENOUS at 05:59

## 2024-09-25 ENCOUNTER — TREATMENT (OUTPATIENT)
Dept: PHYSICAL THERAPY | Facility: CLINIC | Age: 64
End: 2024-09-25
Payer: COMMERCIAL

## 2024-09-25 DIAGNOSIS — M25.562 ACUTE PAIN OF LEFT KNEE: Primary | ICD-10-CM

## 2024-09-25 DIAGNOSIS — Z47.1 AFTERCARE FOLLOWING LEFT KNEE JOINT REPLACEMENT SURGERY: ICD-10-CM

## 2024-09-25 DIAGNOSIS — Z96.652 AFTERCARE FOLLOWING LEFT KNEE JOINT REPLACEMENT SURGERY: ICD-10-CM

## 2024-09-25 DIAGNOSIS — R26.9 GAIT DISTURBANCE: ICD-10-CM

## 2024-09-26 ENCOUNTER — TELEPHONE (OUTPATIENT)
Dept: ORTHOPEDIC SURGERY | Facility: HOSPITAL | Age: 64
End: 2024-09-26
Payer: COMMERCIAL

## 2024-10-01 ENCOUNTER — TREATMENT (OUTPATIENT)
Dept: PHYSICAL THERAPY | Facility: CLINIC | Age: 64
End: 2024-10-01
Payer: COMMERCIAL

## 2024-10-01 DIAGNOSIS — Z96.652 AFTERCARE FOLLOWING LEFT KNEE JOINT REPLACEMENT SURGERY: ICD-10-CM

## 2024-10-01 DIAGNOSIS — M25.562 ACUTE PAIN OF LEFT KNEE: Primary | ICD-10-CM

## 2024-10-01 DIAGNOSIS — Z47.1 AFTERCARE FOLLOWING LEFT KNEE JOINT REPLACEMENT SURGERY: ICD-10-CM

## 2024-10-01 DIAGNOSIS — R26.9 GAIT DISTURBANCE: ICD-10-CM

## 2024-10-01 PROCEDURE — 97110 THERAPEUTIC EXERCISES: CPT | Performed by: PHYSICAL THERAPIST

## 2024-10-01 PROCEDURE — 97112 NEUROMUSCULAR REEDUCATION: CPT | Performed by: PHYSICAL THERAPIST

## 2024-10-01 PROCEDURE — 97140 MANUAL THERAPY 1/> REGIONS: CPT | Performed by: PHYSICAL THERAPIST

## 2024-10-01 NOTE — PROGRESS NOTES
Physical Therapy Daily Treatment Note  Visit: 2    Berenice Granger reports: very sore the day of initial PT session after block wore off.   YOB: 1960  Referring practitioner : Celestine Beltran MD  Date of Initial: Type: THERAPY  Noted: 9/25/2024  Today's date: 10/1/2024  Patient seen for 2 sessions    Visit Diagnoses:    ICD-10-CM ICD-9-CM   1. Acute pain of left knee  M25.562 719.46   2. Gait disturbance  R26.9 781.2   3. Aftercare following left knee joint replacement surgery  Z47.1 V54.81    Z96.652 V43.65       Subjective       Objective   See Exercise, Manual, and Modality Logs for complete treatment.       Assessment/Plan    Limited but improving L knee FL ROM, quad contraction, gait mechanics.  Instructed and reviewed HEP with focus on frequent ROM activity for L knee and short duration gait with good mechanics.     Plan:    Continue            Timed:         Manual Therapy:    9     mins  43571;     Therapeutic Exercise:    25     mins  77272;     Neuromuscular Lobo:    8    mins  12032;          Timed Treatment:   42   mins   Total Treatment:     42   mins         Markell Cr PT, DPT  Physical Therapist  [place] Lic #[id number]

## 2024-10-02 ENCOUNTER — PATIENT MESSAGE (OUTPATIENT)
Dept: ORTHOPEDIC SURGERY | Facility: CLINIC | Age: 64
End: 2024-10-02
Payer: COMMERCIAL

## 2024-10-02 DIAGNOSIS — Z96.659 HISTORY OF PARTIAL KNEE REPLACEMENT: ICD-10-CM

## 2024-10-02 RX ORDER — HYDROCODONE BITARTRATE AND ACETAMINOPHEN 7.5; 325 MG/1; MG/1
1 TABLET ORAL EVERY 6 HOURS PRN
Qty: 30 TABLET | Refills: 0 | Status: SHIPPED | OUTPATIENT
Start: 2024-10-02

## 2024-10-02 NOTE — TELEPHONE ENCOUNTER
From: Berenice Granger  To: Celestine Beltran  Sent: 10/2/2024 4:56 AM EDT  Subject: Request refill on pain medication     I have tried to refill my prescription through the site but it gives me error message. I would like a refill at the [facility] pharmacy at [facility] in [place]… Thank you

## 2024-10-03 ENCOUNTER — TREATMENT (OUTPATIENT)
Dept: PHYSICAL THERAPY | Facility: CLINIC | Age: 64
End: 2024-10-03
Payer: COMMERCIAL

## 2024-10-03 DIAGNOSIS — R26.9 GAIT DISTURBANCE: ICD-10-CM

## 2024-10-03 DIAGNOSIS — M25.562 ACUTE PAIN OF LEFT KNEE: Primary | ICD-10-CM

## 2024-10-03 DIAGNOSIS — Z96.652 AFTERCARE FOLLOWING LEFT KNEE JOINT REPLACEMENT SURGERY: ICD-10-CM

## 2024-10-03 DIAGNOSIS — Z47.1 AFTERCARE FOLLOWING LEFT KNEE JOINT REPLACEMENT SURGERY: ICD-10-CM

## 2024-10-03 NOTE — PROGRESS NOTES
Physical Therapy Daily Treatment Note  Visit: 3    Berenice Granger reports: no new issues today.   YOB: 1960  Referring practitioner : Celestine Beltran MD  Date of Initial: Type: THERAPY  Noted: 9/25/2024  Today's date: 10/3/2024  Patient seen for 3 sessions    Visit Diagnoses:    ICD-10-CM ICD-9-CM   1. Acute pain of left knee  M25.562 719.46   2. Gait disturbance  R26.9 781.2   3. Aftercare following left knee joint replacement surgery  Z47.1 V54.81    Z96.652 V43.65       Subjective       Objective   L knee AROM 8-105 degrees  See Exercise, Manual, and Modality Logs for complete treatment.       Assessment/Plan    Improving L knee ROM - still painful with endrange of flexion.  Educated and reviewed HEP with emphasis on frequent ROM and gait mechanics.     Plan:    Continue            Timed:         Manual Therapy:    2     mins  50382;     Therapeutic Exercise:    28     mins  71555;     Neuromuscular Lobo:    10    mins  86502;          Timed Treatment:   40   mins   Total Treatment:     40   mins         Markell Cr PT, DPT  Physical Therapist  [place] Lic #[id number]

## 2024-10-08 ENCOUNTER — TREATMENT (OUTPATIENT)
Dept: PHYSICAL THERAPY | Facility: CLINIC | Age: 64
End: 2024-10-08
Payer: COMMERCIAL

## 2024-10-08 DIAGNOSIS — R26.9 GAIT DISTURBANCE: ICD-10-CM

## 2024-10-08 DIAGNOSIS — Z96.652 AFTERCARE FOLLOWING LEFT KNEE JOINT REPLACEMENT SURGERY: ICD-10-CM

## 2024-10-08 DIAGNOSIS — Z47.1 AFTERCARE FOLLOWING LEFT KNEE JOINT REPLACEMENT SURGERY: ICD-10-CM

## 2024-10-08 DIAGNOSIS — M25.562 ACUTE PAIN OF LEFT KNEE: Primary | ICD-10-CM

## 2024-10-08 NOTE — PROGRESS NOTES
Physical Therapy Daily Treatment Note  Visit: 4    Berenice Granger reports: no new issues today.  Reports feels this stage of rehab has been easier than with R knee.   YOB: 1960  Referring practitioner : Celestine Beltran MD  Date of Initial: Type: THERAPY  Noted: 9/25/2024  Today's date: 10/8/2024  Patient seen for 4 sessions    Visit Diagnoses:    ICD-10-CM ICD-9-CM   1. Acute pain of left knee  M25.562 719.46   2. Gait disturbance  R26.9 781.2   3. Aftercare following left knee joint replacement surgery  Z47.1 V54.81    Z96.652 V43.65       Subjective       Objective   L knee AROM 5-100 end of visit  See Exercise, Manual, and Modality Logs for complete treatment.       Assessment/Plan  Improving L knee AROM, quad tone, gait mechanics and decreasing L knee pain.     Plan:  Continue               Timed:         Manual Therapy:    9     mins  81128;     Therapeutic Exercise:    26     mins  99754;     Neuromuscular Lobo:    10    mins  56148;    Therapeutic Activity:     8     mins  00232;         Timed Treatment:   53   mins   Total Treatment:     53   mins         Markell Cr PT, DPT  Physical Therapist  [place] Lic #[id number]

## 2024-10-10 ENCOUNTER — TREATMENT (OUTPATIENT)
Dept: PHYSICAL THERAPY | Facility: CLINIC | Age: 64
End: 2024-10-10
Payer: COMMERCIAL

## 2024-10-10 DIAGNOSIS — Z96.652 AFTERCARE FOLLOWING LEFT KNEE JOINT REPLACEMENT SURGERY: ICD-10-CM

## 2024-10-10 DIAGNOSIS — R26.9 GAIT DISTURBANCE: ICD-10-CM

## 2024-10-10 DIAGNOSIS — Z47.1 AFTERCARE FOLLOWING LEFT KNEE JOINT REPLACEMENT SURGERY: ICD-10-CM

## 2024-10-10 DIAGNOSIS — M25.562 ACUTE PAIN OF LEFT KNEE: Primary | ICD-10-CM

## 2024-10-10 NOTE — PROGRESS NOTES
Physical Therapy Daily Treatment Note  Visit: 5    Berenice Granger reports: did well with last visit and pleased as this rehab process has been easier versus her right knee.   YOB: 1960  Referring practitioner : Celestine Beltran MD  Date of Initial: Type: THERAPY  Noted: 9/25/2024  Today's date: 10/10/2024  Patient seen for 5 sessions    Visit Diagnoses:    ICD-10-CM ICD-9-CM   1. Acute pain of left knee  M25.562 719.46   2. Gait disturbance  R26.9 781.2   3. Aftercare following left knee joint replacement surgery  Z47.1 V54.81    Z96.652 V43.65       Subjective       Objective      L knee AROM 3-105 end of visit  See Exercise, Manual, and Modality Logs for complete treatment.       Assessment/Plan    Improving gait mechanics, active quad contraction, decreased left knee pain.  Still limited especially with knee FL ROM and instructed and reviewed HEP and frequency to improve between visits.     Plan:    Continue             Timed:         Manual Therapy:    9     mins  38074;     Therapeutic Exercise:    24     mins  53964;     Neuromuscular Lobo:    11    mins  51525;    Therapeutic Activity:     10     mins  24992;         Timed Treatment:   54   mins   Total Treatment:     54   mins         Markell Cr PT, DPT  Physical Therapist  [place] Lic #[id number]

## 2024-10-11 ENCOUNTER — OFFICE VISIT (OUTPATIENT)
Dept: ORTHOPEDIC SURGERY | Facility: CLINIC | Age: 64
End: 2024-10-11
Payer: COMMERCIAL

## 2024-10-11 VITALS — HEIGHT: 67 IN | TEMPERATURE: 98.6 F | BODY MASS INDEX: 22.7 KG/M2 | WEIGHT: 144.6 LBS

## 2024-10-11 DIAGNOSIS — R52 PAIN: Primary | ICD-10-CM

## 2024-10-11 RX ORDER — SENNOSIDES 8.6 MG
650 CAPSULE ORAL EVERY 8 HOURS PRN
Qty: 50 TABLET | Refills: 0 | Status: SHIPPED | OUTPATIENT
Start: 2024-10-11

## 2024-10-11 NOTE — PROGRESS NOTES
Berenice Granger     : 1960     MRN: [id number]     DATE: 10/11/2024    DIAGNOSIS: Follow-up 2 weeks status post uni-compartment knee arthroplasty    SUBJECTIVE:  Patient returns today for 2 week follow up of recent partial knee replacement. Patient reports doing well with no unusual complaints.  Patient reports compliance with the anticoagulation.    OBJECTIVE:     Exam: The incision is healing appropriately.  No sign of infection.  Range of motion is progressing as expected.  The calf is soft and nontender with a negative Homans sign.    DIAGNOSTIC STUDIES     Xrays: AP and lateral views of the left knee were ordered and reviewed for evaluation of recent uni-compartment knee replacement. They demonstrate a well positioned, well aligned knee replacement without complicating factors noted. In comparison with previous films there has been interval implant placement.    ASSESSMENT: 2 week status post left uni-compartment knee replacement.    PLAN:   1) Order given for outpatient PT   2) Continue to ice as needed.   3) Continue anticoagulation as discussed   4) Follow-up in 6 weeks     Celestine Beltran MD    10/11/2024

## 2024-10-15 ENCOUNTER — TREATMENT (OUTPATIENT)
Dept: PHYSICAL THERAPY | Facility: CLINIC | Age: 64
End: 2024-10-15
Payer: COMMERCIAL

## 2024-10-15 DIAGNOSIS — R26.9 GAIT DISTURBANCE: ICD-10-CM

## 2024-10-15 DIAGNOSIS — Z96.652 AFTERCARE FOLLOWING LEFT KNEE JOINT REPLACEMENT SURGERY: ICD-10-CM

## 2024-10-15 DIAGNOSIS — Z47.1 AFTERCARE FOLLOWING LEFT KNEE JOINT REPLACEMENT SURGERY: ICD-10-CM

## 2024-10-15 DIAGNOSIS — M25.562 ACUTE PAIN OF LEFT KNEE: Primary | ICD-10-CM

## 2024-10-15 PROCEDURE — 97140 MANUAL THERAPY 1/> REGIONS: CPT | Performed by: PHYSICAL THERAPIST

## 2024-10-15 PROCEDURE — 97112 NEUROMUSCULAR REEDUCATION: CPT | Performed by: PHYSICAL THERAPIST

## 2024-10-15 PROCEDURE — 97110 THERAPEUTIC EXERCISES: CPT | Performed by: PHYSICAL THERAPIST

## 2024-10-15 NOTE — PROGRESS NOTES
Physical Therapy Daily Treatment Note  Visit: 6    Berenice Granger reports: returned to MD and agreed she was progressing well overall but a little behind on her ability to bend her left knee.   YOB: 1960  Referring practitioner : Celestine Beltran MD  Date of Initial: Type: THERAPY  Noted: 9/25/2024  Today's date: 10/15/2024  Patient seen for 6 sessions    Visit Diagnoses:    ICD-10-CM ICD-9-CM   1. Acute pain of left knee  M25.562 719.46   2. Gait disturbance  R26.9 781.2   3. Aftercare following left knee joint replacement surgery  Z47.1 V54.81    Z96.652 V43.65       Subjective       Objective   L knee  end of visit  See Exercise, Manual, and Modality Logs for complete treatment.       Assessment/Plan    Limited but improving L knee FL - 95 degrees outset to 110 end of treatment.  L quad contraction limited but improving with good tolerance to both open/closed chain strengthening.     Plan:    Continue           Timed:         Manual Therapy:    11     mins  69968;     Therapeutic Exercise:    22     mins  07498;     Neuromuscular Lobo:    10    mins  23023;          Timed Treatment:   43   mins   Total Treatment:     43   mins         Markell Cr PT, DPT  Physical Therapist  [place] Lic #[id number]

## 2024-10-16 DIAGNOSIS — Z96.659 HISTORY OF PARTIAL KNEE REPLACEMENT: ICD-10-CM

## 2024-10-17 ENCOUNTER — TREATMENT (OUTPATIENT)
Dept: PHYSICAL THERAPY | Facility: CLINIC | Age: 64
End: 2024-10-17
Payer: COMMERCIAL

## 2024-10-17 DIAGNOSIS — Z47.1 AFTERCARE FOLLOWING LEFT KNEE JOINT REPLACEMENT SURGERY: ICD-10-CM

## 2024-10-17 DIAGNOSIS — M25.562 ACUTE PAIN OF LEFT KNEE: Primary | ICD-10-CM

## 2024-10-17 DIAGNOSIS — Z96.652 AFTERCARE FOLLOWING LEFT KNEE JOINT REPLACEMENT SURGERY: ICD-10-CM

## 2024-10-17 DIAGNOSIS — R26.9 GAIT DISTURBANCE: ICD-10-CM

## 2024-10-17 RX ORDER — HYDROCODONE BITARTRATE AND ACETAMINOPHEN 7.5; 325 MG/1; MG/1
1 TABLET ORAL EVERY 6 HOURS PRN
Qty: 30 TABLET | Refills: 0 | Status: SHIPPED | OUTPATIENT
Start: 2024-10-17

## 2024-10-17 NOTE — PROGRESS NOTES
Physical Therapy Daily Treatment Note  Visit: 7    Berenice Granger reports: feeling better with less pain in her left knee and has been focused on bending her knee with HEP per PT instructions.   YOB: 1960  Referring practitioner : Celestine Beltran MD  Date of Initial: Type: THERAPY  Noted: 9/25/2024  Today's date: 10/17/2024  Patient seen for 7 sessions    Visit Diagnoses:    ICD-10-CM ICD-9-CM   1. Acute pain of left knee  M25.562 719.46   2. Gait disturbance  R26.9 781.2   3. Aftercare following left knee joint replacement surgery  Z47.1 V54.81    Z96.652 V43.65       Subjective       Objective   L knee AROM 6-100 end of session  See Exercise, Manual, and Modality Logs for complete treatment.       Assessment/Plan    Improving but limited L knee ROM especially FL, gait mechanics, standing tolerance.     Plan:    Continue           Timed:         Manual Therapy:    3     mins  99033;     Therapeutic Exercise:    21     mins  87806;     Neuromuscular Lobo:    10    mins  98369;    Therapeutic Activity:     9     mins  31602;         Timed Treatment:   43   mins   Total Treatment:     43   mins         Markell Cr PT, DPT  Physical Therapist  [place] Lic #[id number]

## 2024-10-21 ENCOUNTER — TELEPHONE (OUTPATIENT)
Dept: ORTHOPEDIC SURGERY | Facility: HOSPITAL | Age: 64
End: 2024-10-21
Payer: COMMERCIAL

## 2024-10-21 NOTE — TELEPHONE ENCOUNTER
Attempted to reach Ms. Granger to see how she has been doing since her L uni knee 9/24. Message left at this time.

## 2024-10-22 ENCOUNTER — TREATMENT (OUTPATIENT)
Dept: PHYSICAL THERAPY | Facility: CLINIC | Age: 64
End: 2024-10-22
Payer: COMMERCIAL

## 2024-10-22 DIAGNOSIS — M25.562 ACUTE PAIN OF LEFT KNEE: Primary | ICD-10-CM

## 2024-10-22 DIAGNOSIS — R26.9 GAIT DISTURBANCE: ICD-10-CM

## 2024-10-22 DIAGNOSIS — Z96.652 AFTERCARE FOLLOWING LEFT KNEE JOINT REPLACEMENT SURGERY: ICD-10-CM

## 2024-10-22 DIAGNOSIS — Z47.1 AFTERCARE FOLLOWING LEFT KNEE JOINT REPLACEMENT SURGERY: ICD-10-CM

## 2024-10-22 PROCEDURE — 97110 THERAPEUTIC EXERCISES: CPT | Performed by: PHYSICAL THERAPIST

## 2024-10-22 PROCEDURE — 97112 NEUROMUSCULAR REEDUCATION: CPT | Performed by: PHYSICAL THERAPIST

## 2024-10-22 PROCEDURE — 97140 MANUAL THERAPY 1/> REGIONS: CPT | Performed by: PHYSICAL THERAPIST

## 2024-10-22 NOTE — PROGRESS NOTES
Physical Therapy Daily Treatment Note  Visit: 8    Berenice Granger reports: did well with last visit and feels she has made breakthrough with bending her right knee at home.   YOB: 1960  Referring practitioner : Celestine Beltran MD  Date of Initial: Type: THERAPY  Noted: 9/25/2024  Today's date: 10/22/2024  Patient seen for 8 sessions    Visit Diagnoses:    ICD-10-CM ICD-9-CM   1. Acute pain of left knee  M25.562 719.46   2. Gait disturbance  R26.9 781.2   3. Aftercare following left knee joint replacement surgery  Z47.1 V54.81    Z96.652 V43.65       Subjective       Objective   R knee AROM 6-110 end of visit  See Exercise, Manual, and Modality Logs for complete treatment.       Assessment/Plan    Improving L knee ROM, gait mechanics, standing tolerance.  Less pain with endrange knee FL and improved with patellar mobilizations and manual knee FL.     Plan:    Continue              Timed:         Manual Therapy:    9     mins  72426;     Therapeutic Exercise:    25     mins  21862;     Neuromuscular Lobo:    10    mins  74191;          Timed Treatment:   44   mins   Total Treatment:     44   mins         Markell Cr PT, DPT  Physical Therapist  [place] Lic #[id number]

## 2024-10-24 ENCOUNTER — TREATMENT (OUTPATIENT)
Dept: PHYSICAL THERAPY | Facility: CLINIC | Age: 64
End: 2024-10-24
Payer: COMMERCIAL

## 2024-10-24 DIAGNOSIS — M25.562 ACUTE PAIN OF LEFT KNEE: Primary | ICD-10-CM

## 2024-10-24 DIAGNOSIS — Z96.652 AFTERCARE FOLLOWING LEFT KNEE JOINT REPLACEMENT SURGERY: ICD-10-CM

## 2024-10-24 DIAGNOSIS — Z47.1 AFTERCARE FOLLOWING LEFT KNEE JOINT REPLACEMENT SURGERY: ICD-10-CM

## 2024-10-24 DIAGNOSIS — R26.9 GAIT DISTURBANCE: ICD-10-CM

## 2024-10-24 NOTE — PROGRESS NOTES
Physical Therapy Daily Treatment Note  Visit: 9    Berenice Granger reports: improving with less pain and decreased stiffness in her left knee.  YOB: 1960  Referring practitioner : Celestine Beltran MD  Date of Initial: Type: THERAPY  Noted: 9/25/2024  Today's date: 10/24/2024  Patient seen for 9 sessions    Visit Diagnoses:    ICD-10-CM ICD-9-CM   1. Acute pain of left knee  M25.562 719.46   2. Gait disturbance  R26.9 781.2   3. Aftercare following left knee joint replacement surgery  Z47.1 V54.81    Z96.652 V43.65       Subjective       Objective   L knee AROM 5-105 end of visit  See Exercise, Manual, and Modality Logs for complete treatment.       Assessment/Plan    Progressing well with improving but limited R knee ROM especially FL.  Active quad contraction and knee EXT strength improving in L knee along with ability for sit to stand, single leg stance.     Plan:    Continue           Timed:         Manual Therapy:    5     mins  76372;     Therapeutic Exercise:    20     mins  94556;     Neuromuscular Lobo:    10    mins  65859;    Therapeutic Activity:     10     mins  28782;         Timed Treatment:   45   mins   Total Treatment:     45   mins         Markell Cr PT, DPT  Physical Therapist  [place] Lic #[id number]

## 2024-10-29 ENCOUNTER — TREATMENT (OUTPATIENT)
Dept: PHYSICAL THERAPY | Facility: CLINIC | Age: 64
End: 2024-10-29
Payer: COMMERCIAL

## 2024-10-29 DIAGNOSIS — M25.562 ACUTE PAIN OF LEFT KNEE: Primary | ICD-10-CM

## 2024-10-29 DIAGNOSIS — Z47.1 AFTERCARE FOLLOWING LEFT KNEE JOINT REPLACEMENT SURGERY: ICD-10-CM

## 2024-10-29 DIAGNOSIS — R26.9 GAIT DISTURBANCE: ICD-10-CM

## 2024-10-29 DIAGNOSIS — Z96.652 AFTERCARE FOLLOWING LEFT KNEE JOINT REPLACEMENT SURGERY: ICD-10-CM

## 2024-10-30 NOTE — PROGRESS NOTES
Physical Therapy Daily Treatment Note  Visit: 10    Berenice Granger reports: feels she has made additional improvement with her ability to move her left knee and having less pain overall.   YOB: 1960  Referring practitioner : Celestine Beltran MD  Date of Initial: Type: THERAPY  Noted: 9/25/2024  Today's date: 10/30/2024  Patient seen for 10 sessions    Visit Diagnoses:    ICD-10-CM ICD-9-CM   1. Acute pain of left knee  M25.562 719.46   2. Gait disturbance  R26.9 781.2   3. Aftercare following left knee joint replacement surgery  Z47.1 V54.81    Z96.652 V43.65       Subjective       Objective   L knee flexion AROM 110 degrees end of session   See Exercise, Manual, and Modality Logs for complete treatment.       Assessment/Plan    Improving L knee FL, gait mechanics, LE strength.     Plan:    Re-assess next visit             Timed:         Manual Therapy:    13     mins  81631;     Therapeutic Exercise:    15     mins  49889;      Therapeutic Activity:     10     mins  78538;           Timed Treatment:   38   mins   Total Treatment:     38   mins         Markell Cr PT, DPT  Physical Therapist  [place] Lic #[id number]

## 2024-10-31 ENCOUNTER — TREATMENT (OUTPATIENT)
Dept: PHYSICAL THERAPY | Facility: CLINIC | Age: 64
End: 2024-10-31
Payer: COMMERCIAL

## 2024-10-31 DIAGNOSIS — Z96.652 AFTERCARE FOLLOWING LEFT KNEE JOINT REPLACEMENT SURGERY: ICD-10-CM

## 2024-10-31 DIAGNOSIS — M25.562 ACUTE PAIN OF LEFT KNEE: Primary | ICD-10-CM

## 2024-10-31 DIAGNOSIS — R26.9 GAIT DISTURBANCE: ICD-10-CM

## 2024-10-31 DIAGNOSIS — Z47.1 AFTERCARE FOLLOWING LEFT KNEE JOINT REPLACEMENT SURGERY: ICD-10-CM

## 2024-10-31 NOTE — PROGRESS NOTES
Physical Therapy Daily Treatment Note  Visit: 11    Berenice Granger reports: no new issues today.   YOB: 1960  Referring practitioner : Celestine Beltran MD  Date of Initial: Type: THERAPY  Noted: 9/25/2024  Today's date: 10/31/2024  Patient seen for 11 sessions    Visit Diagnoses:    ICD-10-CM ICD-9-CM   1. Acute pain of left knee  M25.562 719.46   2. Gait disturbance  R26.9 781.2   3. Aftercare following left knee joint replacement surgery  Z47.1 V54.81    Z96.652 V43.65       Subjective       Objective   L knee  degrees end of visit.   See Exercise, Manual, and Modality Logs for complete treatment.       Assessment/Plan    Limited but improving L knee FL, quad strength, squatting and gait mechanics.     Plan:    Continue             Timed:         Manual Therapy:    11     mins  91524;     Therapeutic Exercise:    20     mins  77632;     Neuromuscular Lobo:    10    mins  16005;        Timed Treatment:   41   mins   Total Treatment:     41   mins         Markell Cr PT, DPT  Physical Therapist  [place] Lic #[id number]

## 2024-11-05 ENCOUNTER — TREATMENT (OUTPATIENT)
Dept: PHYSICAL THERAPY | Facility: CLINIC | Age: 64
End: 2024-11-05
Payer: COMMERCIAL

## 2024-11-05 DIAGNOSIS — M25.562 ACUTE PAIN OF LEFT KNEE: Primary | ICD-10-CM

## 2024-11-05 DIAGNOSIS — Z47.1 AFTERCARE FOLLOWING LEFT KNEE JOINT REPLACEMENT SURGERY: ICD-10-CM

## 2024-11-05 DIAGNOSIS — R26.9 GAIT DISTURBANCE: ICD-10-CM

## 2024-11-05 DIAGNOSIS — Z96.652 AFTERCARE FOLLOWING LEFT KNEE JOINT REPLACEMENT SURGERY: ICD-10-CM

## 2024-11-05 PROCEDURE — 97140 MANUAL THERAPY 1/> REGIONS: CPT | Performed by: PHYSICAL THERAPIST

## 2024-11-05 PROCEDURE — 97110 THERAPEUTIC EXERCISES: CPT | Performed by: PHYSICAL THERAPIST

## 2024-11-05 NOTE — PROGRESS NOTES
Physical Therapy Daily Treatment Note  [facility] Physical Therapy  [address] [place] [address]     Patient: Berenice Granger   : 1960   Referring practitioner: Celestine Beltran MD  Date of initial visit: Type: THERAPY  Noted: 2024   Today's date: 2024   Patient seen for 12 visits    Visit Diagnoses:    ICD-10-CM ICD-9-CM   1. Acute pain of left knee  M25.562 719.46   2. Gait disturbance  R26.9 781.2   3. Aftercare following left knee joint replacement surgery  Z47.1 V54.81    Z96.652 V43.65       Subjective   Pt reports: Stiff after getting vaccines over the weekend. HEP going well. Flexion ROM remains limited per pt.      Objective   AROM flexion 110 deg pre rx and 118 deg post visit  See Exercise, Manual, and Modality Logs for complete treatment.     Patient Education: HEP    Assessment/Plan ROM improves with rx.      Progress per Plan of Care            Timed:         Manual Therapy:    15     mins  53385;     Therapeutic Exercise:    30     mins  69824;     Neuromuscular Lobo:        mins  02223;    Therapeutic Activity:          mins  91073;     Gait Training:           mins  90954;     Ultrasound:          mins  35835;    Ionto                                   mins   48616  Self Care                            mins   02458    Un-Timed:  Electrical Stimulation:         mins  33864 ( );  Traction          mins 09667  Low Eval          Mins  68655  Mod Eval          Mins  62414  High Eval                            Mins  10801  Re-Eval                               mins  87496    Timed Treatment:   45   mins   Total Treatment:     45   mins      Baldemar Ortiz, PT, DPT, OCS  [place] license: [id number]  Physical Therapist

## 2024-11-07 ENCOUNTER — TREATMENT (OUTPATIENT)
Dept: PHYSICAL THERAPY | Facility: CLINIC | Age: 64
End: 2024-11-07
Payer: COMMERCIAL

## 2024-11-07 DIAGNOSIS — R26.9 GAIT DISTURBANCE: ICD-10-CM

## 2024-11-07 DIAGNOSIS — M25.562 ACUTE PAIN OF LEFT KNEE: Primary | ICD-10-CM

## 2024-11-07 DIAGNOSIS — Z47.1 AFTERCARE FOLLOWING LEFT KNEE JOINT REPLACEMENT SURGERY: ICD-10-CM

## 2024-11-07 DIAGNOSIS — Z96.659 HISTORY OF PARTIAL KNEE REPLACEMENT: ICD-10-CM

## 2024-11-07 DIAGNOSIS — Z96.652 AFTERCARE FOLLOWING LEFT KNEE JOINT REPLACEMENT SURGERY: ICD-10-CM

## 2024-11-07 RX ORDER — HYDROCODONE BITARTRATE AND ACETAMINOPHEN 5; 325 MG/1; MG/1
1 TABLET ORAL EVERY 6 HOURS PRN
Qty: 30 TABLET | Refills: 0 | Status: SHIPPED | OUTPATIENT
Start: 2024-11-07 | End: 2024-11-09 | Stop reason: SDUPTHER

## 2024-11-07 RX ORDER — HYDROCODONE BITARTRATE AND ACETAMINOPHEN 7.5; 325 MG/1; MG/1
1 TABLET ORAL EVERY 6 HOURS PRN
Qty: 30 TABLET | Refills: 0 | Status: CANCELLED | OUTPATIENT
Start: 2024-11-07

## 2024-11-07 NOTE — PROGRESS NOTES
Re-Assessment / Progress Note    Patient: Berenice Granger   : 1960  Diagnosis/ICD-10 Code:  Acute pain of left knee [M25.562]  Referring practitioner: Celestine Beltran MD  Date of Initial Visit: Episode Type: THERAPY  Noted: 2024    Today's Date: 2024  Patient seen for 13 sessions.      Subjective:   Berenice Granger reports: left knee pain is improving and is using pain medication infrequently.  Reports still limited with bending her left knee but is less stiff and  painful and has increased frequency of HEP per PT recommendation.  Subjective Questionnaire:  Knee Outcome Survey 26%  Clinical Progress: improved  Home Program Compliance: Yes  Treatment has included: therapeutic exercise, neuromuscular re-education, manual therapy, therapeutic activity, gait training, electrical stimulation, moist heat, and cryotherapy    Subjective   Objective          Active Range of Motion   Left Knee   Flexion: 112 degrees   Extensor la degrees     Strength/Myotome Testing     Left Knee   Left knee flexion strength: 5-/5.  Extension: 4+  Quadriceps contraction: fair    Right Knee   Flexion: 5  Extension: 5  Quadriceps contraction: good    Ambulation     Comments   Slight decrease stance time/push off L LE, slight limits L knee FL during stance    Functional Assessment     Comments  TUG - 10.74 seconds  FTSST - 16.61 seconds       Assessment & Plan       Assessment  Impairments: abnormal gait, abnormal or restricted ROM, impaired balance, impaired physical strength and pain with function   Functional limitations: walking (Stairs, squatting)  Assessment details: Presents with continued but improved limits in L knee AROM with greater limits in knee FL, FL/EXT strength, ability for squatting and stair ascent and prolonged standing/walking.  She would benefit from skilled PT to address the above and restore to prior level of function without pain.   Progress toward previous goals:  All met or progressed towards      Goals    Short-term goals (STG):   1. L knee AROM 5-110 or greater over 2 weeks. - Met  2. Gait mechanics improved with increased stance time, knee flexion left during left stance and push off of L LE during stance over 2 weeks. - Met  3. L knee pain decreased 20% or greater with walking/standing over 3 weeks. - Met  4. Independent and compliant with HEP over 3 weeks. - Met    Long-term goals (LTG):   1. Knee Outcome Survey improved to 80% or greater over 12 weeks. - Progressed towards   2. L knee AROM 0-120 or greater over 12 weeks. - Progressed towards   3. L knee strength 5/5 and pain free with resisted testing over 12 weeks both FL/EXT. - Progressed towards   4. TUG 10 seconds or less over 12 weeks. - Met  5. Normal gait mechanics w/o assistive device over 12 weeks. - Progressed towards   6. Able to reciprocally ascend/descend flight of stairs w/o compensation or pain over 12 weeks. - Progressed towards   7. Single leg balance and FTSST time within age adjusted limits or above over 12 weeks.  - Progressed towards           Recommendations: Continue with recommendations to continue PT to meet LTG and prior function  Timeframe: 6 weeks  Prognosis to achieve goals: good    PT Signature: Markell Cr, PT,DPT          Based upon review of the patient's progress and continued therapy plan, it is my medical opinion that Berenice Granger should continue physical therapy treatment at [facility]  [address] [place] [address] [phone].        Timed:         Manual Therapy:    14     mins  34404;     Therapeutic Exercise:    20     mins  15613;     Neuromuscular Lobo:    12    mins  54172;    Therapeutic Activity:     10     mins  54248;           Timed Treatment:   56   mins   Total Treatment:     56   mins

## 2024-11-08 ENCOUNTER — OFFICE VISIT (OUTPATIENT)
Dept: ORTHOPEDIC SURGERY | Facility: CLINIC | Age: 64
End: 2024-11-08
Payer: COMMERCIAL

## 2024-11-08 VITALS — TEMPERATURE: 98.6 F | HEIGHT: 67 IN | WEIGHT: 140 LBS | BODY MASS INDEX: 21.97 KG/M2

## 2024-11-08 DIAGNOSIS — Z96.652 S/P LEFT UNICOMPARTMENTAL KNEE REPLACEMENT: Primary | ICD-10-CM

## 2024-11-09 DIAGNOSIS — Z96.659 HISTORY OF PARTIAL KNEE REPLACEMENT: ICD-10-CM

## 2024-11-10 NOTE — PROGRESS NOTES
Berenice Granger : 1960 MRN: [id number] DATE: 11/10/2024    DIAGNOSIS: 6 week follow up left unicompartment knee arthroplasty     SUBJECTIVE:  Patient returns today for 6 week follow up of left unicompartment knee replacement. Patient reports she feels this knee is not progressing as quickly as the right knee did.    Vitals:    24 0952   Temp: 98.6 °F (37 °C)       OBJECTIVE:     Exam:  The incision is well healed. No sign of infection. Range of motion is measured at 5-115°.  The calf is soft and nontender with a negative Homans sign.  Gait is reciprocal heel-to-toe and only mildly antalgic.    DIAGNOSTIC STUDIES    Xrays: 3 views of the left knee (AP, lateral, and sunrise) were ordered and reviewed for evaluation of recent knee replacement. They demonstrate a well positioned, well aligned knee replacement without complicating factors noted. In comparison with previous films there has been no change.    ASSESSMENT:  6 week status post left unicompartment knee replacement    PLAN:   Continue PT per protocol.  I demonstrated some exercises for her to perform hourly to help progress her range of motion.  Continue to ice as needed.  May discontinue anticoagulation.   We discussed the need for prophylactic antibiotics prior to dental appointments for 2 years following replacement surgery.  Follow up in 6 weeks with Dr. Beltran.    AGNIESZKA Reyes  11/10/2024    Answers submitted by the patient for this visit:  Other (Submitted on 2024)  Please describe your symptoms.: Follow up , post surgery  Have you had these symptoms before?: No  How long have you been having these symptoms?: Greater than 2 weeks  Primary Reason for Visit (Submitted on 2024)  What is the primary reason for your visit?: Problem Not Listed

## 2024-11-11 RX ORDER — HYDROCODONE BITARTRATE AND ACETAMINOPHEN 5; 325 MG/1; MG/1
1 TABLET ORAL EVERY 6 HOURS PRN
Qty: 30 TABLET | Refills: 0 | Status: SHIPPED | OUTPATIENT
Start: 2024-11-11

## 2024-11-11 NOTE — TELEPHONE ENCOUNTER
Surgery: 9/24/24, Unicompartment Knee Arthroplasty     Last refill: 11/7/24    Next visit: 12/20/24

## 2024-11-15 ENCOUNTER — TREATMENT (OUTPATIENT)
Dept: PHYSICAL THERAPY | Facility: CLINIC | Age: 64
End: 2024-11-15
Payer: COMMERCIAL

## 2024-11-15 DIAGNOSIS — R26.9 GAIT DISTURBANCE: ICD-10-CM

## 2024-11-15 DIAGNOSIS — M25.562 ACUTE PAIN OF LEFT KNEE: Primary | ICD-10-CM

## 2024-11-15 DIAGNOSIS — Z47.1 AFTERCARE FOLLOWING LEFT KNEE JOINT REPLACEMENT SURGERY: ICD-10-CM

## 2024-11-15 DIAGNOSIS — Z96.652 AFTERCARE FOLLOWING LEFT KNEE JOINT REPLACEMENT SURGERY: ICD-10-CM

## 2024-11-15 NOTE — PROGRESS NOTES
Physical Therapy Daily Progress Note      Patient: Berenice Granger   : 1960  Diagnosis/ICD-10 Code:  Acute pain of left knee [M25.562]  Referring practitioner: Celestine Beltran MD  Date of Initial Visit: Type: THERAPY  Noted: 2024  Today's Date: 11/15/2024  Patient seen for 14 sessions             Subjective Pt reports MD was pleased with her ROM and overall status.  However, last night and this morning she noticed a small bump along her incision and is very tender to touch.  She is concerned that it might be an infection.    Objective   See Exercise, Manual, and Modality Logs for complete treatment.       Assessment/Plan  Pt still lacks full knee extension and flexion ROM.  Good effort and tolerance with treatment today.  Therapist noted the small bump along her incision.  The area had a small area of redness, but did not appear to be to the extent of be alarming.  Told pt to keep track of the redness and TTP, and if these worsened to contact MD office.    Progress per Plan of Care           Timed:         Manual Therapy:    11     mins  99071;     Therapeutic Exercise:    20     mins  53544;     Neuromuscular Lobo:    12    mins  54615;    Therapeutic Activity:     10     mins  28689;         Timed Treatment:   53   mins   Total Treatment:     53   mins        Jorge Li PTA  Physical Therapist Assistant

## 2024-11-18 ENCOUNTER — TELEPHONE (OUTPATIENT)
Dept: ORTHOPEDIC SURGERY | Facility: CLINIC | Age: 64
End: 2024-11-18
Payer: COMMERCIAL

## 2024-11-18 NOTE — TELEPHONE ENCOUNTER
"I spoke to Ms. Granger.  Reports the \"bump\" has improved quite a bit since she left the message in regards to both appearance and tenderness.  I encouraged her to continue to monitor this site.  If it does not seem to improve or worsens, I am happy to see her back in clinic on Wednesday.  She verbalized understanding and agreed to call me Wednesday morning if she feels the area needs to be evaluated.  "

## 2024-11-19 ENCOUNTER — TREATMENT (OUTPATIENT)
Dept: PHYSICAL THERAPY | Facility: CLINIC | Age: 64
End: 2024-11-19
Payer: COMMERCIAL

## 2024-11-19 DIAGNOSIS — M25.562 ACUTE PAIN OF LEFT KNEE: Primary | ICD-10-CM

## 2024-11-19 DIAGNOSIS — Z96.652 AFTERCARE FOLLOWING LEFT KNEE JOINT REPLACEMENT SURGERY: ICD-10-CM

## 2024-11-19 DIAGNOSIS — R26.9 GAIT DISTURBANCE: ICD-10-CM

## 2024-11-19 DIAGNOSIS — Z47.1 AFTERCARE FOLLOWING LEFT KNEE JOINT REPLACEMENT SURGERY: ICD-10-CM

## 2024-11-19 NOTE — PROGRESS NOTES
Physical Therapy Daily Treatment Note  Visit: 15    Berenice Granger reports: can tell improving ROM and pain in her left knee.  Reports has area of soreness in her incision and contacted MD but would like guidance from PT.   YOB: 1960  Referring practitioner : Celestine Beltran MD  Date of Initial: Type: THERAPY  Noted: 9/25/2024  Today's date: 11/19/2024  Patient seen for 15 sessions    Visit Diagnoses:    ICD-10-CM ICD-9-CM   1. Acute pain of left knee  M25.562 719.46   2. Gait disturbance  R26.9 781.2   3. Aftercare following left knee joint replacement surgery  Z47.1 V54.81    Z96.652 V43.65       Subjective       Objective   R knee AROM FL -121 degrees end of visit.   See Exercise, Manual, and Modality Logs for complete treatment.       Assessment/Plan    Improved L knee ROM especially FL, active quad contraction and ability for squat depth and stairs.     Plan:    Continue              Timed:         Manual Therapy:    12     mins  27637;     Therapeutic Exercise:    21     mins  46150;     Neuromuscular Lobo:    10    mins  91845;    Therapeutic Activity:     10     mins  73337;         Timed Treatment:   53   mins   Total Treatment:     53   mins         Markell Cr PT, DPT  Physical Therapist  [place] Lic #[id number]

## 2024-11-25 ENCOUNTER — TREATMENT (OUTPATIENT)
Dept: PHYSICAL THERAPY | Facility: CLINIC | Age: 64
End: 2024-11-25
Payer: COMMERCIAL

## 2024-11-25 DIAGNOSIS — Z47.1 AFTERCARE FOLLOWING LEFT KNEE JOINT REPLACEMENT SURGERY: ICD-10-CM

## 2024-11-25 DIAGNOSIS — Z96.652 AFTERCARE FOLLOWING LEFT KNEE JOINT REPLACEMENT SURGERY: ICD-10-CM

## 2024-11-25 DIAGNOSIS — R26.9 GAIT DISTURBANCE: ICD-10-CM

## 2024-11-25 DIAGNOSIS — M25.562 ACUTE PAIN OF LEFT KNEE: Primary | ICD-10-CM

## 2024-11-25 PROCEDURE — 97112 NEUROMUSCULAR REEDUCATION: CPT | Performed by: PHYSICAL THERAPIST

## 2024-11-25 PROCEDURE — 97140 MANUAL THERAPY 1/> REGIONS: CPT | Performed by: PHYSICAL THERAPIST

## 2024-11-25 PROCEDURE — 97110 THERAPEUTIC EXERCISES: CPT | Performed by: PHYSICAL THERAPIST

## 2024-11-25 PROCEDURE — 97530 THERAPEUTIC ACTIVITIES: CPT | Performed by: PHYSICAL THERAPIST

## 2024-11-25 NOTE — PROGRESS NOTES
Physical Therapy Daily Treatment Note  Visit: 16    Berenice Granger reports: has not been able to do HEP as frequently as she was out of town.   YOB: 1960  Referring practitioner : Celestine Beltran MD  Date of Initial: Type: THERAPY  Noted: 9/25/2024  Today's date: 11/25/2024  Patient seen for 16 sessions    Visit Diagnoses:    ICD-10-CM ICD-9-CM   1. Acute pain of left knee  M25.562 719.46   2. Gait disturbance  R26.9 781.2   3. Aftercare following left knee joint replacement surgery  Z47.1 V54.81    Z96.652 V43.65       Subjective       Objective   L knee AROM 9-120   See Exercise, Manual, and Modality Logs for complete treatment.       Assessment/Plan    Improving L knee ROM with continued difficulty with quad sets with knee EXT but improving with long arc quad, squats, step up.  ROM near full end of session with more limits knee FL than EXT.     Plan:    Continue           Timed:         Manual Therapy:    10     mins  14173;     Therapeutic Exercise:    21    mins  39754;     Neuromuscular Lobo:    10    mins  36450;    Therapeutic Activity:     12     mins  73112;         Timed Treatment:   53   mins   Total Treatment:     53   mins         Markell Cr PT, DPT  Physical Therapist  [place] Lic #[id number]

## 2024-11-26 ENCOUNTER — TREATMENT (OUTPATIENT)
Dept: PHYSICAL THERAPY | Facility: CLINIC | Age: 64
End: 2024-11-26
Payer: COMMERCIAL

## 2024-11-26 DIAGNOSIS — Z47.1 AFTERCARE FOLLOWING LEFT KNEE JOINT REPLACEMENT SURGERY: ICD-10-CM

## 2024-11-26 DIAGNOSIS — R26.9 GAIT DISTURBANCE: ICD-10-CM

## 2024-11-26 DIAGNOSIS — M25.562 ACUTE PAIN OF LEFT KNEE: Primary | ICD-10-CM

## 2024-11-26 DIAGNOSIS — Z96.652 AFTERCARE FOLLOWING LEFT KNEE JOINT REPLACEMENT SURGERY: ICD-10-CM

## 2024-11-26 NOTE — PROGRESS NOTES
Physical Therapy Daily Treatment Note  Visit: 17    Berenice Granger reports: some soreness in both knees today.   YOB: 1960  Referring practitioner : Celestine Beltran MD  Date of Initial: Type: THERAPY  Noted: 9/25/2024  Today's date: 11/26/2024  Patient seen for 17 sessions    Visit Diagnoses:    ICD-10-CM ICD-9-CM   1. Acute pain of left knee  M25.562 719.46   2. Gait disturbance  R26.9 781.2   3. Aftercare following left knee joint replacement surgery  Z47.1 V54.81    Z96.652 V43.65       Subjective       Objective   See Exercise, Manual, and Modality Logs for complete treatment.       Assessment/Plan    More focus on L knee ROM and light resistive activity today per bilateral knee soreness today.  Improved ROM and pain post tx.     Plan:    Continue            Timed:         Manual Therapy:    12     mins  06189;     Therapeutic Exercise:    20     mins  23555;     Therapeutic Activity:     10     mins  71144;           Timed Treatment:   42   mins   Total Treatment:     42   mins         Markell Cr PT, DPT  Physical Therapist  [place] Lic #[id number]

## 2024-12-03 ENCOUNTER — TREATMENT (OUTPATIENT)
Dept: PHYSICAL THERAPY | Facility: CLINIC | Age: 64
End: 2024-12-03
Payer: COMMERCIAL

## 2024-12-03 DIAGNOSIS — Z96.652 AFTERCARE FOLLOWING LEFT KNEE JOINT REPLACEMENT SURGERY: ICD-10-CM

## 2024-12-03 DIAGNOSIS — R26.9 GAIT DISTURBANCE: ICD-10-CM

## 2024-12-03 DIAGNOSIS — Z47.1 AFTERCARE FOLLOWING LEFT KNEE JOINT REPLACEMENT SURGERY: ICD-10-CM

## 2024-12-03 DIAGNOSIS — M25.562 ACUTE PAIN OF LEFT KNEE: Primary | ICD-10-CM

## 2024-12-03 NOTE — PROGRESS NOTES
Physical Therapy Daily Progress Note      Patient: Berenice Granger   : 1960  Diagnosis/ICD-10 Code:  Acute pain of left knee [M25.562]  Referring practitioner: Celestine Beltran MD  Date of Initial Visit: Type: THERAPY  Noted: 2024  Today's Date: 12/3/2024  Patient seen for 18 sessions             Subjective No significant changes to report.    Objective   See Exercise, Manual, and Modality Logs for complete treatment.       Assessment/Plan  Good effort and tolerance with manual techniques and exercises.    Progress per Plan of Care           Timed:         Manual Therapy:    10     mins  53069;     Therapeutic Exercise:    20     mins  98987;     Therapeutic Activity:     10     mins  91523;         Timed Treatment:   40   mins   Total Treatment:     40   mins        Jorge Li PTA  Physical Therapist Assistant

## 2024-12-05 ENCOUNTER — TREATMENT (OUTPATIENT)
Dept: PHYSICAL THERAPY | Facility: CLINIC | Age: 64
End: 2024-12-05
Payer: COMMERCIAL

## 2024-12-05 DIAGNOSIS — M25.562 ACUTE PAIN OF LEFT KNEE: Primary | ICD-10-CM

## 2024-12-05 DIAGNOSIS — Z96.652 AFTERCARE FOLLOWING LEFT KNEE JOINT REPLACEMENT SURGERY: ICD-10-CM

## 2024-12-05 DIAGNOSIS — Z47.1 AFTERCARE FOLLOWING LEFT KNEE JOINT REPLACEMENT SURGERY: ICD-10-CM

## 2024-12-05 DIAGNOSIS — R26.9 GAIT DISTURBANCE: ICD-10-CM

## 2024-12-05 PROCEDURE — 97110 THERAPEUTIC EXERCISES: CPT | Performed by: PHYSICAL THERAPIST

## 2024-12-05 PROCEDURE — 97140 MANUAL THERAPY 1/> REGIONS: CPT | Performed by: PHYSICAL THERAPIST

## 2024-12-05 PROCEDURE — 97112 NEUROMUSCULAR REEDUCATION: CPT | Performed by: PHYSICAL THERAPIST

## 2024-12-05 NOTE — PROGRESS NOTES
Physical Therapy Daily Progress Note      Patient: Berenice Granger   : 1960  Diagnosis/ICD-10 Code:  Acute pain of left knee [M25.562]  Referring practitioner: Celestine Beltran MD  Date of Initial Visit: Type: THERAPY  Noted: 2024  Today's Date: 2024  Patient seen for 19 sessions             Subjective Pt reports her knee is a little sore today, but overall doing well.    Objective   See Exercise, Manual, and Modality Logs for complete treatment.       Assessment/Plan Good tolerance with exercises and manual techniques today.  Progressing well towards goals.    Progress per Plan of Care           Timed:         Manual Therapy:    10     mins  56801;     Therapeutic Exercise:    20     mins  51753;     Neuromuscular Lobo:    10    mins  60444;        Timed Treatment:   40   mins   Total Treatment:     40   mins        Jorge Li PTA  Physical Therapist Assistant

## 2024-12-10 ENCOUNTER — TREATMENT (OUTPATIENT)
Dept: PHYSICAL THERAPY | Facility: CLINIC | Age: 64
End: 2024-12-10
Payer: COMMERCIAL

## 2024-12-10 DIAGNOSIS — Z47.1 AFTERCARE FOLLOWING LEFT KNEE JOINT REPLACEMENT SURGERY: ICD-10-CM

## 2024-12-10 DIAGNOSIS — M25.562 ACUTE PAIN OF LEFT KNEE: Primary | ICD-10-CM

## 2024-12-10 DIAGNOSIS — Z96.652 AFTERCARE FOLLOWING LEFT KNEE JOINT REPLACEMENT SURGERY: ICD-10-CM

## 2024-12-10 DIAGNOSIS — R26.9 GAIT DISTURBANCE: ICD-10-CM

## 2024-12-10 PROCEDURE — 97112 NEUROMUSCULAR REEDUCATION: CPT | Performed by: PHYSICAL THERAPIST

## 2024-12-10 PROCEDURE — 97110 THERAPEUTIC EXERCISES: CPT | Performed by: PHYSICAL THERAPIST

## 2024-12-10 PROCEDURE — 97530 THERAPEUTIC ACTIVITIES: CPT | Performed by: PHYSICAL THERAPIST

## 2024-12-10 NOTE — PROGRESS NOTES
Physical Therapy Daily Treatment Note  Visit: 20    Berenice Granger reports: sore after trip out town in her left knee.   YOB: 1960  Referring practitioner : Celestine Beltran MD  Date of Initial: Type: THERAPY  Noted: 9/25/2024  Today's date: 12/10/2024  Patient seen for 20 sessions    Visit Diagnoses:    ICD-10-CM ICD-9-CM   1. Acute pain of left knee  M25.562 719.46   2. Aftercare following left knee joint replacement surgery  Z47.1 V54.81    Z96.652 V43.65   3. Gait disturbance  R26.9 781.2       Subjective       Objective   See Exercise, Manual, and Modality Logs for complete treatment.       Assessment/Plan    Improving L knee ROM, active quad contraction, gait and activity tolerance.     Plan:    Continue           Timed:         Manual Therapy:    6     mins  21378;     Therapeutic Exercise:    25     mins  80067;     Neuromuscular Lobo:    10    mins  09971;    Therapeutic Activity:     13     mins  02206;           Timed Treatment:   54   mins   Total Treatment:     54   mins         Markell Cr PT, DPT  Physical Therapist  [place] Lic #[id number]

## 2024-12-12 ENCOUNTER — TREATMENT (OUTPATIENT)
Dept: PHYSICAL THERAPY | Facility: CLINIC | Age: 64
End: 2024-12-12
Payer: COMMERCIAL

## 2024-12-12 DIAGNOSIS — R26.9 GAIT DISTURBANCE: ICD-10-CM

## 2024-12-12 DIAGNOSIS — Z47.1 AFTERCARE FOLLOWING LEFT KNEE JOINT REPLACEMENT SURGERY: ICD-10-CM

## 2024-12-12 DIAGNOSIS — Z96.652 AFTERCARE FOLLOWING LEFT KNEE JOINT REPLACEMENT SURGERY: ICD-10-CM

## 2024-12-12 DIAGNOSIS — M25.562 ACUTE PAIN OF LEFT KNEE: Primary | ICD-10-CM

## 2024-12-12 NOTE — PROGRESS NOTES
Physical Therapy Daily Treatment Note  Visit: 21    Berenice Granger reports: can tell continued improvement in L knee ROM, strength.  YOB: 1960  Referring practitioner : Celestine Beltran MD  Date of Initial: Type: THERAPY  Noted: 9/25/2024  Today's date: 12/12/2024  Patient seen for 21 sessions    Visit Diagnoses:    ICD-10-CM ICD-9-CM   1. Acute pain of left knee  M25.562 719.46   2. Aftercare following left knee joint replacement surgery  Z47.1 V54.81    Z96.652 V43.65   3. Gait disturbance  R26.9 781.2       Subjective       Objective   L knee AROM 2-125  L knee EXT strength 4+/5  See Exercise, Manual, and Modality Logs for complete treatment.       Assessment/Plan    ROM/strength and activity tolerance improving in L knee and with bilateral/unilateral strength and balance progressions.     Plan:    Continue           Timed:         Manual Therapy:    8     mins  67224;     Therapeutic Exercise:    25     mins  03896;     Neuromuscular Lobo:    9    mins  91566;    Therapeutic Activity:     13     mins  33306;           Timed Treatment:   55   mins   Total Treatment:     55   mins         Markell Cr PT, DPT  Physical Therapist  [place] Lic #[id number]

## 2024-12-17 ENCOUNTER — TREATMENT (OUTPATIENT)
Dept: PHYSICAL THERAPY | Facility: CLINIC | Age: 64
End: 2024-12-17
Payer: COMMERCIAL

## 2024-12-17 DIAGNOSIS — Z47.1 AFTERCARE FOLLOWING LEFT KNEE JOINT REPLACEMENT SURGERY: ICD-10-CM

## 2024-12-17 DIAGNOSIS — R26.9 GAIT DISTURBANCE: ICD-10-CM

## 2024-12-17 DIAGNOSIS — M25.562 ACUTE PAIN OF LEFT KNEE: Primary | ICD-10-CM

## 2024-12-17 DIAGNOSIS — Z96.652 AFTERCARE FOLLOWING LEFT KNEE JOINT REPLACEMENT SURGERY: ICD-10-CM

## 2024-12-17 PROCEDURE — 97530 THERAPEUTIC ACTIVITIES: CPT | Performed by: PHYSICAL THERAPIST

## 2024-12-17 PROCEDURE — 97110 THERAPEUTIC EXERCISES: CPT | Performed by: PHYSICAL THERAPIST

## 2024-12-17 PROCEDURE — 97140 MANUAL THERAPY 1/> REGIONS: CPT | Performed by: PHYSICAL THERAPIST

## 2024-12-17 PROCEDURE — 97112 NEUROMUSCULAR REEDUCATION: CPT | Performed by: PHYSICAL THERAPIST

## 2024-12-17 NOTE — PROGRESS NOTES
Physical Therapy Daily Treatment Note  Visit: 22    Berenice Granger reports: feels she is continuing to make progress and able to walk and stand more w/o increased L knee pain.  YOB: 1960  Referring practitioner : Celestine Beltran MD  Date of Initial: Type: THERAPY  Noted: 9/25/2024  Today's date: 12/17/2024  Patient seen for 22 sessions    Visit Diagnoses:    ICD-10-CM ICD-9-CM   1. Acute pain of left knee  M25.562 719.46   2. Gait disturbance  R26.9 781.2   3. Aftercare following left knee joint replacement surgery  Z47.1 V54.81    Z96.652 V43.65       Subjective       Objective   L knee AROM - 2-122 beginning - 0-126 end of visit  See Exercise, Manual, and Modality Logs for complete treatment.       Assessment/Plan    Improving L knee ROM ability for squatting, standing, walking with minimal pain.  Progressions of resistance and unilateral exercise well tolerated.     Plan:    Continue:    Likely able to progress towards HEP only over the next few weeks.                Timed:         Manual Therapy:    9     mins  97658;     Therapeutic Exercise:    21     mins  80575;     Neuromuscular Lobo:    15    mins  88012;    Therapeutic Activity:     10     mins  20388;           Timed Treatment:   55   mins   Total Treatment:     55   mins         Markell Cr PT, DPT  Physical Therapist  [place] Lic #[id number]

## 2024-12-19 ENCOUNTER — TREATMENT (OUTPATIENT)
Dept: PHYSICAL THERAPY | Facility: CLINIC | Age: 64
End: 2024-12-19
Payer: COMMERCIAL

## 2024-12-19 DIAGNOSIS — Z96.652 AFTERCARE FOLLOWING LEFT KNEE JOINT REPLACEMENT SURGERY: ICD-10-CM

## 2024-12-19 DIAGNOSIS — M25.562 ACUTE PAIN OF LEFT KNEE: Primary | ICD-10-CM

## 2024-12-19 DIAGNOSIS — R26.9 GAIT DISTURBANCE: ICD-10-CM

## 2024-12-19 DIAGNOSIS — Z47.1 AFTERCARE FOLLOWING LEFT KNEE JOINT REPLACEMENT SURGERY: ICD-10-CM

## 2024-12-20 ENCOUNTER — OFFICE VISIT (OUTPATIENT)
Dept: ORTHOPEDIC SURGERY | Facility: CLINIC | Age: 64
End: 2024-12-20
Payer: COMMERCIAL

## 2024-12-20 VITALS — HEIGHT: 66 IN | WEIGHT: 145.1 LBS | BODY MASS INDEX: 23.32 KG/M2 | TEMPERATURE: 98.3 F

## 2024-12-20 DIAGNOSIS — Z09 SURGERY FOLLOW-UP: Primary | ICD-10-CM

## 2024-12-20 RX ORDER — MELOXICAM 15 MG/1
15 TABLET ORAL DAILY PRN
Qty: 30 TABLET | Refills: 2 | Status: SHIPPED | OUTPATIENT
Start: 2024-12-20

## 2024-12-20 NOTE — PROGRESS NOTES
Physical Therapy Daily Treatment Note  Visit: 23    Berenice Granger reports: no new issues today.   YOB: 1960  Referring practitioner : Celestine Beltran MD  Date of Initial: Type: THERAPY  Noted: 9/25/2024  Today's date: 12/19/2024  Patient seen for 23 sessions    Visit Diagnoses:    ICD-10-CM ICD-9-CM   1. Acute pain of left knee  M25.562 719.46   2. Aftercare following left knee joint replacement surgery  Z47.1 V54.81    Z96.652 V43.65   3. Gait disturbance  R26.9 781.2       Subjective       Objective   See Exercise, Manual, and Modality Logs for complete treatment.       Assessment/Plan    Continued improvement in L knee ROM/strength, gait mechanics, squat/stair ability.  Still some intermittent L medial knee pain with unilateral strengthening but pain free after manual therapy.     Plan:    Continue           Timed:         Manual Therapy:    10    mins  04968;     Therapeutic Exercise:    21     mins  70052;     Neuromuscular Lobo:    12    mins  45196;    Therapeutic Activity:     11     mins  03559;           Timed Treatment:   54   mins   Total Treatment:     54   mins         Markell Cr PT, DPT  Physical Therapist  [place] Lic #[id number]

## 2024-12-20 NOTE — PROGRESS NOTES
Berenice Granger : 1960 MRN: [id number] DATE: 2024     DIAGNOSIS: 3 month follow up left UKA      SUBJECTIVE:  Patient returns today for 3 month follow up of left uni-compartment replacement.  Patient reports doing well with no unusual complaints.     Vitals:    24 1400   Temp: 98.3 °F (36.8 °C)       OBJECTIVE:     Exam:  The incision is well healed. No sign of infection. Range of motion is measured at 0 to 120. The calf is soft and nontender with a negative Homans sign.  Gait is reciprocal heel-to-toe and nonantalgic.    DIAGNOSTIC STUDIES    Xrays: AP, lateral and merchant's views of the left knee are ordered and reviewed for evaluation of recent uni-compartment knee replacement. They demonstrate a well positioned, well aligned partial knee replacement without complicating factors noted.  In comparison with previous films there has been no change.    ASSESSMENT: 3 months status post left uni-compartment knee replacement.    PLAN: 1) Continue with PT exercises as prescribed   2) Follow up in 6 months   3) Antibiotic prophylaxis discussed    Celestine Beltran MD    2024    Answers submitted by the patient for this visit:  Primary Reason for Visit (Submitted on 2024)  What is the primary reason for your visit?: Problem Not Listed  Problem not listed (Submitted on 2024)  Chief Complaint: Other medical problem  abdominal pain: No  anorexia: No  joint pain: No  change in stool: No  chest pain: No  chills: No  nasal congestion: No  cough: No  diaphoresis: No  fatigue: No  fever: No  headaches: No  joint swelling: No  myalgias: No  nausea: No  neck pain: No  numbness: No  rash: No  sore throat: No  swollen glands: No  dysuria: No  vertigo: No  visual change: No  vomiting: No  weakness: No

## 2024-12-23 ENCOUNTER — TELEPHONE (OUTPATIENT)
Dept: PHYSICAL THERAPY | Facility: CLINIC | Age: 64
End: 2024-12-23

## 2025-01-02 ENCOUNTER — TREATMENT (OUTPATIENT)
Dept: PHYSICAL THERAPY | Facility: CLINIC | Age: 65
End: 2025-01-02
Payer: COMMERCIAL

## 2025-01-02 DIAGNOSIS — Z47.1 AFTERCARE FOLLOWING LEFT KNEE JOINT REPLACEMENT SURGERY: ICD-10-CM

## 2025-01-02 DIAGNOSIS — Z96.652 AFTERCARE FOLLOWING LEFT KNEE JOINT REPLACEMENT SURGERY: ICD-10-CM

## 2025-01-02 DIAGNOSIS — R26.9 GAIT DISTURBANCE: ICD-10-CM

## 2025-01-02 DIAGNOSIS — M25.562 ACUTE PAIN OF LEFT KNEE: Primary | ICD-10-CM

## 2025-01-02 NOTE — PROGRESS NOTES
Re-Assessment / Progress Note    Patient: Berenice Granger   : 1960  Diagnosis/ICD-10 Code:  Acute pain of left knee [M25.562]  Referring practitioner: Celestine Beltran MD  Date of Initial Visit: Episode Type: THERAPY  Noted: 2024    Today's Date: 2025  Patient seen for 24 sessions.      Subjective:   Berenice Granger reports: stil some stiffness in her left knee at times but improving strength and minimal pain with most activities.  Reports has continued with regular HEP but less frequently as she returns to more of regular activity.   Subjective Questionnaire:   Knee Outcome Survey - 73%  Clinical Progress: improved  Home Program Compliance: Yes  Treatment has included: therapeutic exercise, neuromuscular re-education, manual therapy, therapeutic activity, gait training, electrical stimulation, moist heat, and cryotherapy    Subjective   Objective          Active Range of Motion   Left Knee   Flexion: 117 degrees   Extensor la degrees     Additional Active Range of Motion Details  L knee  degrees end of treatment    Strength/Myotome Testing     Left Knee   Flexion: 5  Extension: 5  Quadriceps contraction: good    Ambulation     Ambulation: Stairs     Additional Stairs Ambulation Details  WNL    Comments     Good gait mechanics w/o use of assistive device    Functional Assessment     Comments  FTSST - WNL      Assessment & Plan       Assessment  Impairments: abnormal or restricted ROM   Functional limitations: (Deep squatting, uneven ground)  Assessment details: Presents with continued but improved limits in L knee ROM, strength, ability for single leg balance, squatting and navigating uneven ground.  Good HEP compliance and understanding with plan to trail HEP only  Prognosis: good      Progress toward previous goals:  All met or progressed towards    Goals    Short-term goals (STG):     1. L knee AROM 5-110 or greater over 2 weeks. - Met  2. Gait mechanics improved with increased stance  time, knee flexion left during left stance and push off of L LE during stance over 2 weeks. - Met  3. L knee pain decreased 20% or greater with walking/standing over 3 weeks. - Met  4. Independent and compliant with HEP over 3 weeks. - Met      Long-term goals (LTG):   1. Knee Outcome Survey improved to 80% or greater over 12 weeks. - Progressed towards   2. L knee AROM 0-120 or greater over 12 weeks. - Progressed towards   3. L knee strength 5/5 and pain free with resisted testing over 12 weeks both FL/EXT. - Met  4. TUG 10 seconds or less over 12 weeks. - Met  5. Normal gait mechanics w/o assistive device over 12 weeks. - Met  6. Able to reciprocally ascend/descend flight of stairs w/o compensation or pain over 12 weeks. - Progressed towards   7. Single leg balance and FTSST time within age adjusted limits or above over 12 weeks.  - Met            Recommendations: Continue with recommendations to discharge to [facility] only  Timeframe: 1 week  Prognosis to achieve goals: good    PT Signature: Markell Cr, PT, DPT          Based upon review of the patient's progress and continued therapy plan, it is my medical opinion that Berenice Granger should continue physical therapy treatment at [facility] PH[facility] TH[facility]  [address] [place] [address] [phone].        Timed:         Manual Therapy:    9     mins  07439;     Therapeutic Exercise:    22     mins  50088;     Neuromuscular Lobo:    13    mins  59010;    Therapeutic Activity:     10     mins  19025;             Timed Treatment:   54  mins   Total Treatment:     54   mins

## 2025-01-20 DIAGNOSIS — Z96.652 HISTORY OF ARTHROPLASTY OF LEFT KNEE: ICD-10-CM

## 2025-01-20 DIAGNOSIS — Z79.2 NEED FOR PROPHYLACTIC ANTIBIOTIC: Primary | ICD-10-CM

## 2025-01-20 RX ORDER — CLINDAMYCIN HYDROCHLORIDE 300 MG/1
CAPSULE ORAL
Qty: 2 CAPSULE | Refills: 2 | Status: CANCELLED | OUTPATIENT
Start: 2025-01-20

## 2025-01-20 RX ORDER — CEPHALEXIN 500 MG/1
CAPSULE ORAL
Qty: 4 CAPSULE | Refills: 1 | Status: SHIPPED | OUTPATIENT
Start: 2025-01-20

## 2025-06-20 ENCOUNTER — OFFICE VISIT (OUTPATIENT)
Dept: ORTHOPEDIC SURGERY | Facility: CLINIC | Age: 65
End: 2025-06-20
Payer: COMMERCIAL

## 2025-06-20 VITALS — BODY MASS INDEX: 24.09 KG/M2 | TEMPERATURE: 98.4 F | HEIGHT: 67 IN | WEIGHT: 153.5 LBS

## 2025-06-20 DIAGNOSIS — Z09 SURGERY FOLLOW-UP: ICD-10-CM

## 2025-06-20 DIAGNOSIS — R52 PAIN: Primary | ICD-10-CM

## 2025-06-20 PROCEDURE — 99212 OFFICE O/P EST SF 10 MIN: CPT | Performed by: ORTHOPAEDIC SURGERY

## 2025-06-20 RX ORDER — CEPHALEXIN 500 MG/1
CAPSULE ORAL
Qty: 4 CAPSULE | Refills: 2 | Status: SHIPPED | OUTPATIENT
Start: 2025-06-20

## 2025-06-20 NOTE — PROGRESS NOTES
"Berenice Granger     : 1960     MRN: [id number]    DATE: 2025    DIAGNOSIS:  Follow up bilateral knee unicompartmental replacements    SUBJECTIVE:  Patient returns today for annual follow-up of bilateral partial knee replacement.  She says the knees are doing great.  She is very happy with her outcome on both sides.  Denies any complaints or concerns.    OBJECTIVE:  Temp 98.4 °F (36.9 °C)   Ht 170.2 cm (67\")   Wt 69.6 kg (153 lb 8 oz)   BMI 24.04 kg/m²   Family History   Problem Relation Age of Onset    Osteoporosis Father     Cancer Father         Non hodgkins    Malig Hyperthermia Neg Hx      Past Medical History:   Diagnosis Date    Allergic 1970    Penicillin    Ankle sprain     Many since then    Arthritis of back ?    CTS (carpal tunnel syndrome)     Corisone Injection in both wrists    Disease of thyroid gland     Dry senile macular degeneration     GERD (gastroesophageal reflux disease)     Hyperlipidemia     Hypothyroidism     Knee swelling     TAE KNEES. HISTORY OF TORN MENISCUS IN TAE KNEES    Lower back pain     Osteoarthritis (arthritis due to wear and tear of joints)     Seasonal allergies     Spastic colon     Tear of meniscus of knee 2020    Both knees     Past Surgical History:   Procedure Laterality Date    COLONOSCOPY      JOINT REPLACEMENT      Right knee    KNEE ARTHROPLASTY UNICOMPARTMENTAL Left 2024    Procedure: Unicompartment Knee Arthroplasty;  Surgeon: Celestine Beltran MD;  Location: [facility];  Service: Orthopedics;  Laterality: Left;    KNEE ARTHROSCOPY      TAE KNEES    LAPAROSCOPIC CHOLECYSTECTOMY      TOTAL KNEE ARTHROPLASTY Right 2024    Procedure: Unicompartment knee arthroplasty;  Surgeon: Celestine Beltran MD;  Location: [facility];  Service: Orthopedics;  Laterality: Right;    UTERINE FIBROID EMBOLIZATION      WISDOM TOOTH EXTRACTION      WRIST SURGERY       Social History     Socioeconomic History    Marital " status:    Tobacco Use    Smoking status: Never     Passive exposure: Never    Smokeless tobacco: Never   Vaping Use    Vaping status: Never Used   Substance and Sexual Activity    Alcohol use: Yes     Alcohol/week: 1.0 standard drink of alcohol     Types: 1 Glasses of wine per week     Comment: SOME WINE WITH DINNER 3-4 TIMES WEEK    Drug use: Never    Sexual activity: Yes     Partners: Male     Birth control/protection: Post-menopausal       Review of Systems:   A 10 point review of systems is reviewed with the patient.  Pertinent positives are listed above.  All others negative.    Exam:  The incisions are well healed.  Range of motion: 0 to 125 bilaterally.  Alignment is neutral.  Good quad strength bilaterally. There is no evidence of varus/valgus or flexion instability in either knee.  Intact sensation to light touch.  Palpable pedal pulses    DIAGNOSTIC STUDIES    Xrays: AP, merchant and lateral views of both knees were ordered and reviewed for evaluation of recent injury compartment knee replacements.  The x-rays demonstrate well positioned, well aligned knee replacements without complicating factors noted.  In comparison with previous films there has been no change.    ASSESSMENT: Annual follow-up for bilateral unicompartmental knee replacements    PLAN: Appropriate activity modifications and restrictions discussed.  Antibiotic prophylaxis recommendations discussed.  She has an upcoming dental appointment so I gave her prescription for Keflex to take as needed.  We talked about the risk with respect to her penicillin allergy.  She says she has done well with Keflex in the past.  Follow-up annually.    Celestine Beltran MD

## (undated) DEVICE — MIS 3.2MM X 45MM RIMMED PIN STERILE: Brand: HARMONY

## (undated) DEVICE — INTENDED TO SUPPORT AND MAINTAIN THE POSITION OF AN ANESTHETIZED PATIENT DURING SURGERY: Brand: HERMANTOR XL PINK KNEE POSITIONING PAD

## (undated) DEVICE — DUAL CUT SAGITTAL BLADE

## (undated) DEVICE — NEEDLE, QUINCKE, 20GX3.5": Brand: MEDLINE

## (undated) DEVICE — SOL ISO/ALC 70PCT 4OZ

## (undated) DEVICE — SKIN PREP TRAY W/CHG: Brand: MEDLINE INDUSTRIES, INC.

## (undated) DEVICE — ZIP 24 SURGICAL SKIN CLOSURE DEVICE, PSA: Brand: ZIP 24 SURGICAL SKIN CLOSURE DEVICE

## (undated) DEVICE — GLV SURG SENSICARE PI PF LF 7 GRN STRL

## (undated) DEVICE — GLV SURG PREMIERPRO ORTHO LTX PF SZ8 BRN

## (undated) DEVICE — BNDG,ELSTC,MATRIX,STRL,6"X5YD,LF,HOOK&LP: Brand: MEDLINE

## (undated) DEVICE — CEMENT MIXING SYSTEM WITH FEMORAL BREAKWAY NOZZLE: Brand: REVOLUTION

## (undated) DEVICE — ANTIBACTERIAL UNDYED BRAIDED (POLYGLACTIN 910), SYNTHETIC ABSORBABLE SUTURE: Brand: COATED VICRYL

## (undated) DEVICE — SUT VIC 2/0 CT2 27IN J269H

## (undated) DEVICE — 2108 SERIES SAGITTAL BLADE, NO OFFSET  (12.4 X 1.19 X 82.1MM)

## (undated) DEVICE — SOL IRR NACL 0.9PCT 3000ML

## (undated) DEVICE — GLV SURG BIOGEL LTX PF 7

## (undated) DEVICE — TRAP FLD MINIVAC MEGADYNE 100ML

## (undated) DEVICE — PATIENT RETURN ELECTRODE, SINGLE-USE, CONTACT QUALITY MONITORING, ADULT, WITH 9FT CORD, FOR PATIENTS WEIGING OVER 33LBS. (15KG): Brand: MEGADYNE

## (undated) DEVICE — APPL CHLORAPREP HI/LITE 26ML ORNG

## (undated) DEVICE — RECIPROCATING BLADE, DOUBLE SIDED, OFFSET  (70.0 X 0.64 X 12.6MM)

## (undated) DEVICE — STPLR SKIN VISISTAT WD 35CT

## (undated) DEVICE — PREP SOL POVIDONE/IODINE BT 4OZ

## (undated) DEVICE — GLV SURG SENSICARE PI MIC PF SZ7 LF STRL

## (undated) DEVICE — GLV SURG BIOGEL LTX PF 6 1/2

## (undated) DEVICE — GLV SURG SENSICARE POLYISPRN W/ALOE PF LF 6.5 GRN STRL

## (undated) DEVICE — STERILE PATIENT PROTECTIVE PAD FOR IMP® KNEE POSITIONERS & COHESIVE WRAP (10 / CASE): Brand: DE MAYO KNEE POSITIONER®

## (undated) DEVICE — PK KN TOTL 40

## (undated) DEVICE — RECIPROCATING BLADE HEAVY DUTY LONG, OFFSET  (77.6 X 0.77 X 11.2MM)

## (undated) DEVICE — DISPOSABLE TOURNIQUET CUFF SINGLE BLADDER, SINGLE PORT AND QUICK CONNECT CONNECTOR: Brand: COLOR CUFF

## (undated) DEVICE — PENCL SMOKE/EVAC MEGADYNE TELESCP 10FT

## (undated) DEVICE — MIS 3.2MM X 30MM RIMMED PIN STERILE: Brand: HARMONY

## (undated) DEVICE — UNDERCAST PADDING: Brand: DEROYAL

## (undated) DEVICE — GLV SURG BIOGEL LTX PF 8 1/2

## (undated) DEVICE — DECANTER BAG 9": Brand: MEDLINE INDUSTRIES, INC.

## (undated) DEVICE — GLV SURG SIGNATURE ESSENTIAL PF LTX SZ8.5